# Patient Record
Sex: FEMALE | Race: WHITE | NOT HISPANIC OR LATINO | Employment: OTHER | URBAN - METROPOLITAN AREA
[De-identification: names, ages, dates, MRNs, and addresses within clinical notes are randomized per-mention and may not be internally consistent; named-entity substitution may affect disease eponyms.]

---

## 2017-03-20 ENCOUNTER — COMMUNICATION - HEALTHEAST (OUTPATIENT)
Dept: FAMILY MEDICINE | Facility: CLINIC | Age: 62
End: 2017-03-20

## 2017-07-05 ENCOUNTER — COMMUNICATION - HEALTHEAST (OUTPATIENT)
Dept: FAMILY MEDICINE | Facility: CLINIC | Age: 62
End: 2017-07-05

## 2017-07-06 ENCOUNTER — OFFICE VISIT - HEALTHEAST (OUTPATIENT)
Dept: FAMILY MEDICINE | Facility: CLINIC | Age: 62
End: 2017-07-06

## 2017-07-06 DIAGNOSIS — Z01.818 PRE-OPERATIVE CLEARANCE: ICD-10-CM

## 2017-07-06 DIAGNOSIS — Z00.00 HEALTH CARE MAINTENANCE: ICD-10-CM

## 2017-07-06 DIAGNOSIS — M21.612 BUNION OF LEFT FOOT: ICD-10-CM

## 2017-07-06 LAB
ATRIAL RATE - MUSE: 72 BPM
CHOLEST SERPL-MCNC: 168 MG/DL
DIASTOLIC BLOOD PRESSURE - MUSE: NORMAL MMHG
FASTING STATUS PATIENT QL REPORTED: NO
HDLC SERPL-MCNC: 44 MG/DL
INTERPRETATION ECG - MUSE: NORMAL
LDLC SERPL CALC-MCNC: 98 MG/DL
P AXIS - MUSE: 42 DEGREES
PR INTERVAL - MUSE: 118 MS
QRS DURATION - MUSE: 80 MS
QT - MUSE: 400 MS
QTC - MUSE: 438 MS
R AXIS - MUSE: -26 DEGREES
SYSTOLIC BLOOD PRESSURE - MUSE: NORMAL MMHG
T AXIS - MUSE: 50 DEGREES
TRIGL SERPL-MCNC: 130 MG/DL
VENTRICULAR RATE- MUSE: 72 BPM

## 2017-07-06 ASSESSMENT — MIFFLIN-ST. JEOR: SCORE: 1174.88

## 2017-07-07 ENCOUNTER — COMMUNICATION - HEALTHEAST (OUTPATIENT)
Dept: FAMILY MEDICINE | Facility: CLINIC | Age: 62
End: 2017-07-07

## 2017-09-12 ENCOUNTER — COMMUNICATION - HEALTHEAST (OUTPATIENT)
Dept: FAMILY MEDICINE | Facility: CLINIC | Age: 62
End: 2017-09-12

## 2017-11-03 ENCOUNTER — COMMUNICATION - HEALTHEAST (OUTPATIENT)
Dept: FAMILY MEDICINE | Facility: CLINIC | Age: 62
End: 2017-11-03

## 2017-11-09 ENCOUNTER — COMMUNICATION - HEALTHEAST (OUTPATIENT)
Dept: FAMILY MEDICINE | Facility: CLINIC | Age: 62
End: 2017-11-09

## 2017-11-09 ENCOUNTER — OFFICE VISIT - HEALTHEAST (OUTPATIENT)
Dept: FAMILY MEDICINE | Facility: CLINIC | Age: 62
End: 2017-11-09

## 2017-11-09 DIAGNOSIS — F33.2 SEVERE RECURRENT MAJOR DEPRESSION (H): ICD-10-CM

## 2017-11-09 DIAGNOSIS — F41.1 GENERALIZED ANXIETY DISORDER: ICD-10-CM

## 2018-02-05 ENCOUNTER — COMMUNICATION - HEALTHEAST (OUTPATIENT)
Dept: FAMILY MEDICINE | Facility: CLINIC | Age: 63
End: 2018-02-05

## 2018-05-30 ENCOUNTER — COMMUNICATION - HEALTHEAST (OUTPATIENT)
Dept: FAMILY MEDICINE | Facility: CLINIC | Age: 63
End: 2018-05-30

## 2018-07-01 ENCOUNTER — COMMUNICATION - HEALTHEAST (OUTPATIENT)
Dept: FAMILY MEDICINE | Facility: CLINIC | Age: 63
End: 2018-07-01

## 2018-07-01 DIAGNOSIS — R05.9 COUGH: ICD-10-CM

## 2018-07-24 ENCOUNTER — COMMUNICATION - HEALTHEAST (OUTPATIENT)
Dept: FAMILY MEDICINE | Facility: CLINIC | Age: 63
End: 2018-07-24

## 2018-07-24 DIAGNOSIS — R05.9 COUGH: ICD-10-CM

## 2018-09-17 ENCOUNTER — COMMUNICATION - HEALTHEAST (OUTPATIENT)
Dept: FAMILY MEDICINE | Facility: CLINIC | Age: 63
End: 2018-09-17

## 2018-09-17 DIAGNOSIS — R05.9 COUGH: ICD-10-CM

## 2018-10-17 ENCOUNTER — COMMUNICATION - HEALTHEAST (OUTPATIENT)
Dept: FAMILY MEDICINE | Facility: CLINIC | Age: 63
End: 2018-10-17

## 2018-10-23 ENCOUNTER — COMMUNICATION - HEALTHEAST (OUTPATIENT)
Dept: FAMILY MEDICINE | Facility: CLINIC | Age: 63
End: 2018-10-23

## 2018-10-23 DIAGNOSIS — R05.9 COUGH: ICD-10-CM

## 2018-11-20 ENCOUNTER — OFFICE VISIT - HEALTHEAST (OUTPATIENT)
Dept: FAMILY MEDICINE | Facility: CLINIC | Age: 63
End: 2018-11-20

## 2018-11-20 DIAGNOSIS — B37.0 ORAL PHARYNGEAL CANDIDIASIS: ICD-10-CM

## 2018-11-20 RX ORDER — BUPROPION HYDROCHLORIDE 150 MG/1
TABLET, FILM COATED, EXTENDED RELEASE ORAL
Status: SHIPPED | COMMUNITY
Start: 2018-11-01 | End: 2021-11-03

## 2018-11-20 RX ORDER — PREDNISONE 20 MG/1
TABLET ORAL
Status: SHIPPED | COMMUNITY
Start: 2018-11-14 | End: 2021-11-03

## 2018-11-20 ASSESSMENT — MIFFLIN-ST. JEOR: SCORE: 1151.63

## 2019-01-14 ENCOUNTER — COMMUNICATION - HEALTHEAST (OUTPATIENT)
Dept: FAMILY MEDICINE | Facility: CLINIC | Age: 64
End: 2019-01-14

## 2019-01-14 DIAGNOSIS — R05.9 COUGH: ICD-10-CM

## 2019-02-15 ENCOUNTER — COMMUNICATION - HEALTHEAST (OUTPATIENT)
Dept: FAMILY MEDICINE | Facility: CLINIC | Age: 64
End: 2019-02-15

## 2019-02-19 RX ORDER — SERTRALINE HYDROCHLORIDE 100 MG/1
TABLET, FILM COATED ORAL
Qty: 90 TABLET | Refills: 0 | Status: SHIPPED | OUTPATIENT
Start: 2019-02-19 | End: 2021-11-03

## 2019-08-24 ENCOUNTER — COMMUNICATION - HEALTHEAST (OUTPATIENT)
Dept: FAMILY MEDICINE | Facility: CLINIC | Age: 64
End: 2019-08-24

## 2019-08-24 DIAGNOSIS — R05.9 COUGH: ICD-10-CM

## 2019-11-12 ENCOUNTER — RECORDS - HEALTHEAST (OUTPATIENT)
Dept: ADMINISTRATIVE | Facility: OTHER | Age: 64
End: 2019-11-12

## 2020-01-19 ENCOUNTER — COMMUNICATION - HEALTHEAST (OUTPATIENT)
Dept: FAMILY MEDICINE | Facility: CLINIC | Age: 65
End: 2020-01-19

## 2020-01-19 DIAGNOSIS — R52 PAIN: ICD-10-CM

## 2020-02-24 ENCOUNTER — COMMUNICATION - HEALTHEAST (OUTPATIENT)
Dept: FAMILY MEDICINE | Facility: CLINIC | Age: 65
End: 2020-02-24

## 2020-02-24 DIAGNOSIS — R05.9 COUGH: ICD-10-CM

## 2020-05-06 ENCOUNTER — COMMUNICATION - HEALTHEAST (OUTPATIENT)
Dept: FAMILY MEDICINE | Facility: CLINIC | Age: 65
End: 2020-05-06

## 2020-05-06 DIAGNOSIS — R52 PAIN: ICD-10-CM

## 2020-06-21 ENCOUNTER — COMMUNICATION - HEALTHEAST (OUTPATIENT)
Dept: FAMILY MEDICINE | Facility: CLINIC | Age: 65
End: 2020-06-21

## 2020-06-21 DIAGNOSIS — R05.9 COUGH: ICD-10-CM

## 2020-08-10 ENCOUNTER — COMMUNICATION - HEALTHEAST (OUTPATIENT)
Dept: FAMILY MEDICINE | Facility: CLINIC | Age: 65
End: 2020-08-10

## 2020-08-10 DIAGNOSIS — R05.9 COUGH: ICD-10-CM

## 2020-09-01 ENCOUNTER — COMMUNICATION - HEALTHEAST (OUTPATIENT)
Dept: FAMILY MEDICINE | Facility: CLINIC | Age: 65
End: 2020-09-01

## 2020-09-01 DIAGNOSIS — R05.9 COUGH: ICD-10-CM

## 2020-09-04 RX ORDER — ALBUTEROL SULFATE 90 UG/1
AEROSOL, METERED RESPIRATORY (INHALATION)
Qty: 18 G | Refills: 0 | Status: SHIPPED | OUTPATIENT
Start: 2020-09-04

## 2020-10-01 ENCOUNTER — COMMUNICATION - HEALTHEAST (OUTPATIENT)
Dept: FAMILY MEDICINE | Facility: CLINIC | Age: 65
End: 2020-10-01

## 2020-10-01 DIAGNOSIS — R52 PAIN: ICD-10-CM

## 2020-10-03 RX ORDER — IBUPROFEN 800 MG/1
TABLET, FILM COATED ORAL
Qty: 90 TABLET | Refills: 0 | Status: SHIPPED | OUTPATIENT
Start: 2020-10-03 | End: 2021-11-03

## 2021-03-07 ENCOUNTER — IMMUNIZATION (OUTPATIENT)
Dept: NURSING | Facility: CLINIC | Age: 66
End: 2021-03-07
Payer: COMMERCIAL

## 2021-03-07 ENCOUNTER — HEALTH MAINTENANCE LETTER (OUTPATIENT)
Age: 66
End: 2021-03-07

## 2021-03-07 PROCEDURE — 0031A PR COVID VAC JANSSEN AD26 0.5ML: CPT

## 2021-03-07 PROCEDURE — 91303 PR COVID VAC JANSSEN AD26 0.5ML: CPT

## 2021-05-27 ENCOUNTER — RECORDS - HEALTHEAST (OUTPATIENT)
Dept: ADMINISTRATIVE | Facility: CLINIC | Age: 66
End: 2021-05-27

## 2021-05-28 ENCOUNTER — RECORDS - HEALTHEAST (OUTPATIENT)
Dept: ADMINISTRATIVE | Facility: CLINIC | Age: 66
End: 2021-05-28

## 2021-05-30 ENCOUNTER — RECORDS - HEALTHEAST (OUTPATIENT)
Dept: ADMINISTRATIVE | Facility: CLINIC | Age: 66
End: 2021-05-30

## 2021-05-31 VITALS — BODY MASS INDEX: 24.65 KG/M2 | WEIGHT: 141.38 LBS

## 2021-05-31 VITALS — BODY MASS INDEX: 24.95 KG/M2 | WEIGHT: 146.13 LBS | HEIGHT: 64 IN

## 2021-05-31 NOTE — TELEPHONE ENCOUNTER
Refill NOT  Approved  Refill NOT Given> 15 months since last office visit  Rx renewed per Medication Renewal Policy. Medication was last renewed on 1/15/19.    Mei Bone, [facility] Connection Triage/Med Refill 8/25/2019     Requested Prescriptions   Pending Prescriptions Disp Refills     VENTOLIN HFA 90 mcg/actuation inhaler [Pharmacy Med Name: Ventolin HFA Inhalation Aerosol Solution 108 (90 Base) MCG/ACT] 18 g 3     Sig: INHALE ONE OR TWO PUFFS BY MOUTH EVERY FOUR TO SIX HOURS AS NEEDED AND AS DIRECTED.       Albuterol/Levalbuterol Refill Protocol Passed - 8/24/2019  7:01 AM        Passed - PCP or prescribing provider visit in last year     Last office visit with prescriber/PCP: 11/9/2017 Keshia Woody MD OR same dept: 11/20/2018 Desirae Solis MD OR same specialty: 11/20/2018 Desirae Solis MD Last physical: 7/6/2017       Next appt within 3 mo: Visit date not found  Next physical within 3 mo: Visit date not found  Prescriber OR PCP: Keshia Woody MD  Last diagnosis associated with med order: 1. Cough  - VENTOLIN HFA 90 mcg/actuation inhaler [Pharmacy Med Name: Ventolin HFA Inhalation Aerosol Solution 108 (90 Base) MCG/ACT]; INHALE ONE OR TWO PUFFS BY MOUTH EVERY FOUR TO SIX HOURS AS NEEDED AND AS DIRECTED.  Dispense: 18 g; Refill: 3    If protocol passes may refill for 6 months if within 3 months of last provider visit (or a total of 9 months). If patient requesting >1 inhaler per month refill x 6 months and have patient make appointment with provider.

## 2021-06-02 VITALS — HEIGHT: 64 IN | WEIGHT: 141 LBS | BODY MASS INDEX: 24.07 KG/M2

## 2021-06-05 ENCOUNTER — RECORDS - HEALTHEAST (OUTPATIENT)
Dept: FAMILY MEDICINE | Facility: CLINIC | Age: 66
End: 2021-06-05

## 2021-06-05 NOTE — TELEPHONE ENCOUNTER
Refill sent today. Pt due for office visit before any further refills can be given. Please call to schedule. Recommend she schedule a physical exam

## 2021-06-05 NOTE — TELEPHONE ENCOUNTER
RN cannot approve Refill Request    RN can NOT refill this medication med is not covered by policy/route to provider. Last office visit: 11/9/2017 Keshia Woody MD Last Physical: 7/6/2017 Last MTM visit: Visit date not found Last visit same specialty: 11/20/2018 Desirae Solis MD.  Next visit within 3 mo: Visit date not found  Next physical within 3 mo: Visit date not found      Amee Torres, [facility] Triage/Med Refill 1/19/2020    Requested Prescriptions   Pending Prescriptions Disp Refills     ibuprofen (ADVIL,MOTRIN) 800 MG tablet [Pharmacy Med Name: Ibuprofen Oral Tablet 800 MG] 90 tablet 5     Sig: TAKE ONE TABLET BY MOUTH THREE TIMES DAILY       There is no refill protocol information for this order

## 2021-06-05 NOTE — TELEPHONE ENCOUNTER
Spoke to patient, she states she goes to the health clinic at her work for physicals and blood pressure checks which is why she has not been in. Advised patient that Dr. Woody may still need to see her and she requested I send a message to let Dr. Woody know she has been doing her physicals and blood pressure checks at the clinic at her work. CAROLA

## 2021-06-06 NOTE — TELEPHONE ENCOUNTER
Refill NOT Given    Refill given per Policy, patient informed they are overdue for Office Visit   OV 11/20/18    Mei Bone, [facility] Triage/Med Refill 2/25/2020    Requested Prescriptions   Pending Prescriptions Disp Refills     VENTOLIN HFA 90 mcg/actuation inhaler [Pharmacy Med Name: Ventolin HFA Inhalation Aerosol Solution 108 (90 Base) MCG/ACT] 18 g 2     Sig: INHALE ONE OR TWO PUFFS BY MOUTH EVERY FOUR TO SIX HOURS AS NEEDED AND AS DIRECTED.       Albuterol/Levalbuterol Refill Protocol Failed - 2/24/2020  1:15 PM        Failed - PCP or prescribing provider visit in last year     Last office visit with prescriber/PCP: 11/9/2017 Keshia Woody MD OR same dept: Visit date not found OR same specialty: 11/20/2018 Desirae Solis MD Last physical: 7/6/2017       Next appt within 3 mo: Visit date not found  Next physical within 3 mo: Visit date not found  Prescriber OR PCP: Keshia Woody MD  Last diagnosis associated with med order: 1. Cough  - VENTOLIN HFA 90 mcg/actuation inhaler [Pharmacy Med Name: Ventolin HFA Inhalation Aerosol Solution 108 (90 Base) MCG/ACT]; INHALE ONE OR TWO PUFFS BY MOUTH EVERY FOUR TO SIX HOURS AS NEEDED AND AS DIRECTED.  Dispense: 18 g; Refill: 2    If protocol passes may refill for 6 months if within 3 months of last provider visit (or a total of 9 months). If patient requesting >1 inhaler per month refill x 6 months and have patient make appointment with provider.

## 2021-06-08 NOTE — TELEPHONE ENCOUNTER
RN cannot approve Refill Request    RN can NOT refill this medication med is not covered by policy/route to provider. Last office visit: 11/9/2017 Keshia Woody MD Last Physical: 7/6/2017 Last MTM visit: Visit date not found Last visit same specialty: 11/20/2018 Desirae Solis MD.  Next visit within 3 mo: Visit date not found  Next physical within 3 mo: Visit date not found      Katja Nam, [facility] Triage/Med Refill 5/6/2020    Requested Prescriptions   Pending Prescriptions Disp Refills     ibuprofen (ADVIL,MOTRIN) 800 MG tablet [Pharmacy Med Name: Ibuprofen Oral Tablet 800 MG] 90 tablet 0     Sig: TAKE ONE TABLET BY MOUTH THREE TIMES DAILY       There is no refill protocol information for this order

## 2021-06-09 NOTE — TELEPHONE ENCOUNTER
RN cannot approve Refill Request    RN can NOT refill this medication PCP messaged that patient is overdue for Office Visit. Last office visit: 11/9/2017 Keshia Woody MD Last Physical: 7/6/2017 Last MTM visit: Visit date not found Last visit same specialty: 11/20/2018 Desirae Solis MD.  Next visit within 3 mo: Visit date not found  Next physical within 3 mo: Visit date not found      Amee Torres, [facility] Triage/Med Refill 6/21/2020    Requested Prescriptions   Pending Prescriptions Disp Refills     VENTOLIN HFA 90 mcg/actuation inhaler [Pharmacy Med Name: Ventolin HFA Inhalation Aerosol Solution 108 (90 Base) MCG/ACT] 18 g 0     Sig: INHALE 1 - 2 PUFFS BY MOUTH EVERY 4 - 6 HOURS AS NEEDED AND AS DIRECTED.       Albuterol/Levalbuterol Refill Protocol Failed - 6/21/2020  7:00 AM        Failed - PCP or prescribing provider visit in last year     Last office visit with prescriber/PCP: 11/9/2017 Keshia Woody MD OR same dept: Visit date not found OR same specialty: 11/20/2018 Desirae Solis MD Last physical: 7/6/2017       Next appt within 3 mo: Visit date not found  Next physical within 3 mo: Visit date not found  Prescriber OR PCP: Keshia Woody MD  Last diagnosis associated with med order: 1. Cough  - VENTOLIN HFA 90 mcg/actuation inhaler [Pharmacy Med Name: Ventolin HFA Inhalation Aerosol Solution 108 (90 Base) MCG/ACT]; INHALE 1 - 2 PUFFS BY MOUTH EVERY 4 - 6 HOURS AS NEEDED AND AS DIRECTED.  Dispense: 18 g; Refill: 0    If protocol passes may refill for 6 months if within 3 months of last provider visit (or a total of 9 months). If patient requesting >1 inhaler per month refill x 6 months and have patient make appointment with provider.

## 2021-06-10 NOTE — TELEPHONE ENCOUNTER
RN cannot approve Refill Request    RN can NOT refill this medication PCP messaged that patient is overdue for Office Visit. Last office visit: 11/9/2017 Keshia Woody MD Last Physical: 7/6/2017 Last MTM visit: Visit date not found Last visit same specialty: 11/20/2018 Desirae Solis MD.  Next visit within 3 mo: Visit date not found  Next physical within 3 mo: Visit date not found      Amee Torres, [facility] Triage/Med Refill 8/11/2020    Requested Prescriptions   Pending Prescriptions Disp Refills     VENTOLIN HFA 90 mcg/actuation inhaler [Pharmacy Med Name: Ventolin HFA Inhalation Aerosol Solution 108 (90 Base) MCG/ACT] 18 g 0     Sig: INHALE 1 - 2 PUFFS BY MOUTH EVERY 4 - 6 HOURS AS NEEDED AND AS DIRECTED.       Albuterol/Levalbuterol Refill Protocol Failed - 8/10/2020  5:37 PM        Failed - PCP or prescribing provider visit in last year     Last office visit with prescriber/PCP: 11/9/2017 Keshia Woody MD OR same dept: Visit date not found OR same specialty: 11/20/2018 Desirae Solis MD Last physical: 7/6/2017       Next appt within 3 mo: Visit date not found  Next physical within 3 mo: Visit date not found  Prescriber OR PCP: Keshia Woody MD  Last diagnosis associated with med order: 1. Cough  - VENTOLIN HFA 90 mcg/actuation inhaler [Pharmacy Med Name: Ventolin HFA Inhalation Aerosol Solution 108 (90 Base) MCG/ACT]; INHALE 1 - 2 PUFFS BY MOUTH EVERY 4 - 6 HOURS AS NEEDED AND AS DIRECTED.  Dispense: 18 g; Refill: 0    If protocol passes may refill for 6 months if within 3 months of last provider visit (or a total of 9 months). If patient requesting >1 inhaler per month refill x 6 months and have patient make appointment with provider.

## 2021-06-11 NOTE — TELEPHONE ENCOUNTER
RN cannot approve Refill Request    RN can NOT refill this medication Protocol failed and NO refill given. Last office visit: 11/9/2017 Keshia Woody MD Last Physical: 7/6/2017 Last MTM visit: Visit date not found Last visit same specialty: 11/20/2018 Desirae Solis MD.  Next visit within 3 mo: Visit date not found  Next physical within 3 mo: Visit date not found      Kathy Hurley, [facility] Triage/Med Refill 9/4/2020    Requested Prescriptions   Pending Prescriptions Disp Refills     albuterol (PROAIR HFA;PROVENTIL HFA;VENTOLIN HFA) 90 mcg/actuation inhaler [Pharmacy Med Name: Albuterol Sulfate HFA Inhalation Aerosol Solution 108 (90 Base) MCG/ACT] 18 g 0     Sig: INHALE 1 - 2 PUFFS BY MOUTH EVERY 4 - 6 HOURS AS NEEDED AND AS DIRECTED.       Albuterol/Levalbuterol Refill Protocol Failed - 9/1/2020  2:50 PM        Failed - PCP or prescribing provider visit in last year     Last office visit with prescriber/PCP: 11/9/2017 Keshia Woody MD OR same dept: Visit date not found OR same specialty: 11/20/2018 Desirae Solis MD Last physical: 7/6/2017       Next appt within 3 mo: Visit date not found  Next physical within 3 mo: Visit date not found  Prescriber OR PCP: Keshia Woody MD  Last diagnosis associated with med order: 1. Cough  - albuterol (PROAIR HFA;PROVENTIL HFA;VENTOLIN HFA) 90 mcg/actuation inhaler [Pharmacy Med Name: Albuterol Sulfate HFA Inhalation Aerosol Solution 108 (90 Base) MCG/ACT]; INHALE 1 - 2 PUFFS BY MOUTH EVERY 4 - 6 HOURS AS NEEDED AND AS DIRECTED.  Dispense: 18 g; Refill: 0    If protocol passes may refill for 6 months if within 3 months of last provider visit (or a total of 9 months). If patient requesting >1 inhaler per month refill x 6 months and have patient make appointment with provider.

## 2021-06-11 NOTE — PROGRESS NOTES
Assessment/Plan:      Visit for Preoperative Exam.     Patient approved for surgery with general or local anesthesia. Labs will be done as indicated. Above recommendations were reviewed with the patient. Copy of the pre-op was given to the patient to bring along on the day of surgery. Proceed with proposed surgery without additional clinical clarifications. No active cardiac conditions.     Underlying risk factors include history of tobacco use.  Advised smoking cessation    Advised to hold NSAIDs prior to surgery.    Labs today will include a hemogram and comprehensive metabolic panel.    Will obtain lipid cascade for health maintenance.  She was provided with stool cards for health maintenance.    Will start sertraline for treatment of anxiety and depression.  Prescription sent to pharmacy.  She will take 50 mg daily for 1 week and then increase to 100 mg daily.  Counseled on use of medication and side effects.  She will not plan to start this until after her surgery.    Subjective:     Scheduled Procedure: Left bunion removal  Surgery Date:  7/12/2017  Surgery Location:  [facility]  Surgeon:  DR Shepard    62-year-old female presents today for preoperative exam.  She has a bunion of the left foot that is causing discomfort.  History of left bunion surgery many years ago that was unsuccessful.  She is now scheduled for repeat bunionectomy.  Reviewed her health history.  History of anxiety and depression.  Not currently taking any medications.  Reports that she was recently started on citalopram at her wellness clinic at work.  Only took that for about 2 or 3 nights and then discontinued due to adverse side effects.  She is interested in going back on sertraline which she has taken in the past and tolerated well without any adverse side effects.  She felt it did help improve her symptoms but then discontinued when she felt that it was no longer effective.  She continues to smoke.  She is planning to  work on smoking cessation again.  No other chronic medical problems.  Has an albuterol inhaler that she uses rarely.  Reviewed allergies and medications.  No other concerns or questions today.    Current Outpatient Prescriptions   Medication Sig Dispense Refill     cyanocobalamin (VITAMIN B-12) 500 MCG tablet Take 1,000 mcg by mouth daily.       ibuprofen (ADVIL,MOTRIN) 800 MG tablet take 1 tablet by mouth 3 times per day as needed 90 tablet 0     VENTOLIN HFA 90 mcg/actuation inhaler INHALE 1 TO 2 PUFFS EVERY 4 TO 6 HOURS AS NEEDED AND AS DIRECTED 18 g 1     No current facility-administered medications for this visit.        No Known Allergies    Immunization History   Administered Date(s) Administered     Influenza [id number], 01/14/2010     Influenza, seasonal,quad inj 6-35 mos 11/07/2011     Pneumo Polysac 23-V 11/23/2015     Td, historic 12/21/2001     Tdap 06/06/2014       Patient Active Problem List   Diagnosis     Hypertension     Moderate Recurrent Major Depression     Lump In / On The Skin     Chronic Major Depression     Lower Back Pain     Lumbar Radiculopathy       History reviewed. No pertinent past medical history.    Social History     Social History     Marital status:      Spouse name: N/A     Number of children: N/A     Years of education: N/A     Occupational History     Not on file.     Social History Main Topics     Smoking status: Current Every Day Smoker     Smokeless tobacco: Not on file      Comment: cutting back 1/25/2016     Alcohol use 2.4 oz/week     4 Cans of beer per week     Drug use: No     Sexual activity: Not on file     Other Topics Concern     Not on file     Social History Narrative       Past Surgical History:   Procedure Laterality Date     BACK SURGERY       BUNIONECTOMY Bilateral      CARPAL TUNNEL RELEASE Bilateral      CHOLECYSTECTOMY       VAGOTOMY         History of Present Illness  Recent Health  Fever: no  Chills: no  Fatigue: yes--always tired  Chest Pain:  "no  Cough: no  Dyspnea: no  Urinary Frequency: no  Nausea: no  Vomiting: no  Diarrhea: no  Abdominal Pain: no  Easy Bruising: no  Lower Extremity Swelling: no  Poor Exercise Tolerance: no        Pertinent History  Prior Anesthesia: yes  Previous Anesthesia Reaction:  no  Diabetes: no  Cardiovascular Disease: no  Pulmonary Disease: no  Renal Disease: no  GI Disease: no  Sleep Apnea: no  Thromboembolic Problems: no  Clotting Disorder: no  Bleeding Disorder: no  Transfusion Reaction: no  Impaired Immunity: no  Steroid use in the last 6 months: no  Frequent Aspirin use: no    Family history of no pertinent family history    Social history of patient wears dentures or partial plates and there are no concerns regarding care after surgery    After surgery, the patient plans to recover at home with family.    Review of Systems  Pertinent items are noted in HPI.          Objective:         Vitals:    07/06/17 1358   BP: 140/78   Pulse: 67   Temp: 98.8  F (37.1  C)   TempSrc: Tympanic   SpO2: 97%   Weight: 146 lb 2 oz (66.3 kg)   Height: 5' 3.5\" (1.613 m)       Physical Exam:  Physical Exam:  General Appearance: Alert, cooperative, no distress, appears stated age  Head: Normocephalic, without obvious abnormality, atraumatic  Eyes: PERRL, conjunctiva/corneas clear, EOM's intact  Ears: Normal TM's and external ear canals, both ears  Nose: Nares normal, septum midline,mucosa normal, no drainage  Throat: Lips, mucosa, and tongue normal; teeth and gums normal  Neck: Supple, symmetrical, trachea midline, no adenopathy;  thyroid: not enlarged, symmetric, no tenderness/mass/nodules; no carotid bruit or JVD  Back: Symmetric, no curvature, ROM normal  Lungs: Clear to auscultation bilaterally, respirations unlabored  Heart: Regular rate and rhythm, S1 and S2 normal, no murmur, rub, or gallop, Abdomen: Soft, non-tender, bowel sounds active all four quadrants,  no masses, no organomegaly  Pelvic:Not examined  Extremities: Extremities " normal, atraumatic, no cyanosis or edema  Skin: Skin color, texture, turgor normal, no rashes or lesions  Lymph nodes: Cervical, supraclavicular nodes normal  Neurologic: Normal        Results: EKG performed in clinic.  I personally reviewed this EKG.  It showed sinus rhythm.  Heart rate 72.  Nonspecific ST segment changes.  No acute abnormalities.

## 2021-06-12 NOTE — TELEPHONE ENCOUNTER
RN cannot approve Refill Request    RN can NOT refill this medication med is not covered by policy/route to provider. Last office visit: 11/9/2017 Keshia Woody MD Last Physical: 7/6/2017 Last MTM visit: Visit date not found Last visit same specialty: 11/20/2018 Desirae Solis MD.  Next visit within 3 mo: Visit date not found  Next physical within 3 mo: Visit date not found      Amee Torres, [facility] Triage/Med Refill 10/3/2020    Requested Prescriptions   Pending Prescriptions Disp Refills     ibuprofen (ADVIL,MOTRIN) 800 MG tablet [Pharmacy Med Name: Ibuprofen Oral Tablet 800 MG] 90 tablet 0     Sig: TAKE ONE TABLET BY MOUTH THREE TIMES DAILY       There is no refill protocol information for this order

## 2021-06-14 NOTE — PROGRESS NOTES
Assessment/Plan:     1. Generalized anxiety disorder     2. Severe recurrent major depression         Diagnoses and all orders for this visit:    Generalized anxiety disorder    Severe recurrent major depression    Other orders  -     sertraline (ZOLOFT) 100 MG tablet; Take 1/2 tablet by mouth daily for 1 week and then increase to 1 tablet daily  Dispense: 90 tablet; Refill: 3    Discussed common side effects with sertraline.  Discussed it will take about a month before the medication starts to take effect.  I would recommend follow-up office visit in about 1 month's time.  Completed [place] paperwork.         Subjective:      Gabrielle Rod is a 62 y.o. female who comes in today for completion of FMLA paperwork.  She is currently going through a lot of emotional stress.  She moved about a week ago.  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  In the process of moving, the department of child protection services showed up at her doorstep to try to take her great grandson away.  She is now in the process of trying to obtain custody of the child.  She has several upcoming court dates and will be needing to schedule medical appointments for her great grandson as well as attend family therapy.  She is not able to concentrate and perform her work duties because of the anxiety and stress that she is under.  She is not able to sleep at night.  She is still not completely unpacked and settled into her new place.  She is very tearful during the encounter today.  Her last day of work was November 1 and she is planning to return on December 4, 2017.  Needs paperwork completed today for her to receive short-term disability income.  She is requesting to restart medication for management of anxiety and depression.  In the past has taken a number of SSRI medications.  Has had adverse side effects with the number of them including citalopram which has caused nausea.  She has done best with sertraline  although this does cause a little bit of nausea.  She would like to give the sertraline and try again and just plan to put up with the nausea.  She is using Benadryl to help with sleep.  Medications and allergies are reviewed.  No other concerns or questions today.    Current Outpatient Prescriptions   Medication Sig Dispense Refill     ibuprofen (ADVIL,MOTRIN) 800 MG tablet take 1 tablet by mouth 3 times per day as needed 90 tablet 0     cyanocobalamin (VITAMIN B-12) 500 MCG tablet Take 1,000 mcg by mouth daily.       sertraline (ZOLOFT) 100 MG tablet Take 1/2 tablet by mouth daily for 1 week and then increase to 1 tablet daily 90 tablet 3     VENTOLIN HFA 90 mcg/actuation inhaler INHALE 1 TO 2 PUFFS EVERY 4 TO 6 HOURS AS NEEDED AND AS DIRECTED 18 g 1     No current facility-administered medications for this visit.        Past Medical History, Family History, and Social History reviewed.  No past medical history on file.  Past Surgical History:   Procedure Laterality Date     BACK SURGERY       BUNIONECTOMY Bilateral      CARPAL TUNNEL RELEASE Bilateral      CHOLECYSTECTOMY       VAGOTOMY       Review of patient's allergies indicates no known allergies.  No family history on file.  Social History     Social History     Marital status:      Spouse name: N/A     Number of children: N/A     Years of education: N/A     Occupational History     Not on file.     Social History Main Topics     Smoking status: Current Every Day Smoker     Smokeless tobacco: Never Used      Comment: cutting back 1/25/2016     Alcohol use 2.4 oz/week     4 Cans of beer per week     Drug use: No     Sexual activity: Not on file     Other Topics Concern     Not on file     Social History Narrative         Review of systems is as stated in HPI, and the remainder of the 10 system review is otherwise negative.    Objective:     Vitals:    11/09/17 1259   BP: 136/88   Patient Site: Left Arm   Patient Position: Sitting   Cuff Size: Adult  Regular   Pulse: 84   SpO2: 99%   Weight: 141 lb 6 oz (64.1 kg)    Body mass index is 24.65 kg/(m^2).    General appearance: alert, appears stated age and cooperative  Neurologic: Grossly normal  Psych: mood appropriate, emotionally labile    Results: PHQ-9: 15   GEORGIE-7: 18    This note has been dictated using voice recognition software. Any grammatical or context distortions are unintentional and inherent to the the software.

## 2021-06-16 PROBLEM — R05.9 COUGH: Status: ACTIVE | Noted: 2018-07-02

## 2021-06-23 NOTE — TELEPHONE ENCOUNTER
RN cannot approve Refill Request    RN can NOT refill this medication med is not covered by policy/route to provider.      Kathy Hurley, [facility] Triage/Med Refill 1/15/2019    Requested Prescriptions   Pending Prescriptions Disp Refills      mg tablet [Pharmacy Med Name: IBU Oral Tablet 800 MG] 90 tablet 0     Sig: TAKE 1 TABLET BY MOUTH 3 TIMES DAILY AS NEEDED.    There is no refill protocol information for this order      Signed Prescriptions Disp Refills     VENTOLIN HFA 90 mcg/actuation inhaler 18 g 4     Sig: INHALE 1 TO 2 PUFFS BY MOUTH EVERY 4 TO 6 HOURS AS NEEDED AND AS DIRECTED.    Albuterol/Levalbuterol Refill Protocol Passed - 1/14/2019  1:22 PM       Passed - PCP or prescribing provider visit in last year    Last office visit with prescriber/PCP: 11/9/2017 Keshia Woody MD OR same dept: 11/20/2018 Desirae Solis MD OR same specialty: 11/20/2018 Desirae Solis MD Last physical: 7/6/2017       Next appt within 3 mo: Visit date not found  Next physical within 3 mo: Visit date not found  Prescriber OR PCP: Keshia Woody MD  Last diagnosis associated with med order: 1. Cough  - VENTOLIN HFA 90 mcg/actuation inhaler; INHALE 1 TO 2 PUFFS BY MOUTH EVERY 4 TO 6 HOURS AS NEEDED AND AS DIRECTED.  Dispense: 18 g; Refill: 4    If protocol passes may refill for 6 months if within 3 months of last provider visit (or a total of 9 months). If patient requesting >1 inhaler per month refill x 6 months and have patient make appointment with provider.

## 2021-06-23 NOTE — TELEPHONE ENCOUNTER
Refill Approved    Rx renewed per Medication Renewal Policy. Medication was last renewed on 10/24/18.    Kathy Hurley, [facility] Triage/Med Refill 1/15/2019     Requested Prescriptions   Pending Prescriptions Disp Refills     VENTOLIN HFA 90 mcg/actuation inhaler [Pharmacy Med Name: Ventolin HFA Inhalation Aerosol Solution 108 (90 Base) MCG/ACT] 18 g 0     Sig: INHALE 1 TO 2 PUFFS BY MOUTH EVERY 4 TO 6 HOURS AS NEEDED AND AS DIRECTED.    Albuterol/Levalbuterol Refill Protocol Passed - 1/14/2019  1:22 PM       Passed - PCP or prescribing provider visit in last year    Last office visit with prescriber/PCP: 11/9/2017 Keshia Woody MD OR same dept: 11/20/2018 Desirae Solis MD OR same specialty: 11/20/2018 Desirae Solis MD Last physical: 7/6/2017       Next appt within 3 mo: Visit date not found  Next physical within 3 mo: Visit date not found  Prescriber OR PCP: Keshia Woody MD  Last diagnosis associated with med order: 1. Cough  - VENTOLIN HFA 90 mcg/actuation inhaler [Pharmacy Med Name: Ventolin HFA Inhalation Aerosol Solution 108 (90 Base) MCG/ACT]; INHALE 1 TO 2 PUFFS BY MOUTH EVERY 4 TO 6 HOURS AS NEEDED AND AS DIRECTED.  Dispense: 18 g; Refill: 0    If protocol passes may refill for 6 months if within 3 months of last provider visit (or a total of 9 months). If patient requesting >1 inhaler per month refill x 6 months and have patient make appointment with provider.           mg tablet [Pharmacy Med Name: IBU Oral Tablet 800 MG] 90 tablet 0     Sig: TAKE 1 TABLET BY MOUTH 3 TIMES DAILY AS NEEDED.    There is no refill protocol information for this order

## 2021-06-24 NOTE — TELEPHONE ENCOUNTER
Left message to call back for: pt  Information to relay to patient:  Left message to call and schedule medication check with pcp.

## 2021-06-24 NOTE — TELEPHONE ENCOUNTER
Left message to call back for: pt  Information to relay to patient:  2nd message to call and schedule med check with pcp.

## 2021-06-24 NOTE — TELEPHONE ENCOUNTER
RN Cannot refill Rx    Last relevant visit was 11/9/17. She was seen for unrelated issue on 11/20/18 . Please advise!    Yolanda Beebe, [facility] Triage/Med Refill 2/18/2019     Requested Prescriptions   Pending Prescriptions Disp Refills     sertraline (ZOLOFT) 100 MG tablet [Pharmacy Med Name: Sertraline HCl Oral Tablet 100 MG] 90 tablet 2     Sig: Take 1/2 tablet by mouth daily for 1 week and then increase to 1 tablet daily.    SSRI Refill Protocol  Passed - 2/15/2019  9:35 AM       Passed - PCP or prescribing provider visit in last year    Last office visit with prescriber/PCP: 11/9/2017 Keshia Woody MD OR same dept: 11/20/2018 Desirae Solis MD OR same specialty: 11/20/2018 Desirae Solis MD  Last physical: 7/6/2017 Last MTM visit: Visit date not found   Next visit within 3 mo: Visit date not found  Next physical within 3 mo: Visit date not found  Prescriber OR PCP: Keshia Woody MD  Last diagnosis associated with med order: There are no diagnoses linked to this encounter.  If protocol passes may refill for 12 months if within 3 months of last provider visit (or a total of 15 months).

## 2021-06-26 NOTE — PROGRESS NOTES
Progress Notes by Desirae Solis MD at 11/20/2018  2:10 PM     Author: Desirae Solis MD Service: -- Author Type: Physician    Filed: 11/20/2018  4:10 PM Encounter Date: 11/20/2018 Status: Signed    : Desirae Solis MD (Physician)         Assessment/Plan:     1. Oral pharyngeal candidiasis  I suspect this is most likely related to prolonged course of prednisone to treat her hip injury and pain.  Advised avoidance of prednisone.  I think is unlikely that it is related to Wellbutrin, however think for now would be best for her to stay away.  I would be opposed to her trying it again in the future.  We will treat oropharyngeal and likely esophageal candidiasis with fluconazole daily for 14 days which should treat both.  She may use Maalox as needed both for heartburn and irritation in her mouth, advised to swish and swallow approach.  Continue to work on tobacco cessation.  We will have her take Prilosec once daily for 2 weeks to suppress any acid that might also be compounding irritation of her esophagus.  Notify with onset of additional symptoms, lack of improvement, or worsening.  Continue to monitor URI symptoms.      Patient Instructions     Patient Education     Candida Infection: Thrush  Thrush is a fungal infection in the mouth and throat. Thrush does not usually affect healthy adults. It is more common in people with a weak immune system. It is also more likely if you take antibiotics. Thrush is normally not contagious.  Understanding fungus in the mouth and throat  Your mouth and throat normally contain millions of tiny organisms. These include bacteria and yeasts. Many of these do not cause any problems. In fact, they may help fight disease.  Yeasts are a type of fungus. A type of yeast called Candida normally lives on the membranes of your mouth and throat. Usually, this yeast grows only in small amounts and is harmless. But in some cases, Candida can grow out of control and cause  thrush. Thrush is related to other kinds of Candida infections that can grow all over the body. Thrush refers to an infection of only the mouth and throat.  What causes thrush?  Thrush happens when something lets too much Candida grow inside your mouth and throat. Certain things that change the normal balance of organisms in the mouth can lead to thrush. One example is antibiotic medicine. This medicine may kill some of the normal bacteria in your mouth. Candida can then grow freely. People on antibiotics have an increased risk for thrush.  You have a higher risk for thrush if you:    Wear dentures    Are getting chemotherapy    Are getting radiation therapy    Have diabetes    Have a transplanted organ    Use corticosteroids, including inhaled corticosteroids for lung disease    Have a weak immune system, such as from AIDS    Are an older adult  Symptoms of thrush  Symptoms of thrush can include:    A dry, cottony feeling in your mouth    Cracking at the corners of the mouth    Loss of taste    Pain while eating or swallowing    White patches on the tongue and around the sides of the mouth  Diagnosing thrush  Your healthcare provider will ask about your medical history and your symptoms. He or she will look closely at your mouth and throat. White or red patches will be scraped with a tongue depressor. The sample will be sent to a lab to test. This test can usually confirm thrush.  If you have thrush, you may also have esophageal candidiasis. This is common in people who have HIV or a weak immune system. Your healthcare provider may check for this condition with an upper endoscopy. This is a procedure to look at the esophagus. A tissue sample may be taken to test.  Treatment for thrush  Thrush is usually treated with antifungal medicine. The medicine is put directly in your mouth and throat. You may be given a swish and swallow medicine or an antifungal lozenge.  In some cases, you may need an antifungal pill. This  can remove Candida throughout your body. Or you may need medicine through an intravenous line ( IV). These treatments depend on how severe your infection is, and what other health conditions you have.  If you are at high risk for thrush, you may need to keep taking oral antifungal medicine. This is to help prevent thrush in the future.  What happens if you dont get treated for thrush?  If untreated, the Candida may spread throughout your body. They may even enter your bloodstream. This can cause serious problems, such as organ failure and even death. Bloodstream infection may need to be treated with high doses of antifungal medicine through an IV.  Systemic infection is much more likely in people who are very ill. It is also more common in those who have serious problems with their immune system. Additional risk factors for systemic infection in very ill people include:    Central venous lines    IV nutrition    Use of broad-spectrum antibiotics    Kidney failure    Recent surgery  Preventing thrush  You may be able to help prevent some cases of thrush. Make sure to:    Practice good oral hygiene. Try using a chlorhexidine mouthwash.    Clean your dentures regularly as instructed. Make sure they fit you correctly.    After using a corticosteroid inhaler, rinse out your mouth with water or mouthwash.    Do not use broad-spectrum antibiotics, if possible.    Get treated for health problems that increase your risk for thrush, such as diabetes.     When to call the healthcare provider  Call your healthcare provider right away if you have any of these:    Cottony feeling in your mouth    Loss of taste    Pain while eating or swallowing    White patches or plaques on your tongue or inside your mouth   Date Last Reviewed: 5/1/2017 2000-2017 [employer]. [address] [place] [address], [place] [address]. All rights reserved. This information is not intended as a substitute for professional medical care. Always  follow your healthcare professional's instructions.                No Follow-up on file.      Subjective:      Gabrielle Rod is a 63 y.o. female presented to clinic today for evaluation of burning in her throat and heartburn.  She has had some difficulty with ongoing hip pain, was seen at the wellness clinic at her place of work, initially treated with a course of prednisone, then extended to complete a full 10-day course, 40 mg daily.  This is now completed, it did provide some relief of his pain though some of the discomfort persists.  She was also started on Wellbutrin at around the same to assist in tobacco cessation.  Notes that over the past several days she has developed a raw sensation in her throat, significant painful heartburn in her central chest, poor sleep, and intermittent vomiting.  She has had a cough off and on as well, especially at bedtime, feeling as though there is some postnasal drainage in her throat.  Minimal sinus congestion or pressure.  Her sore stomach she believes is from coughing.  She has had some night sweats, some sweats and chills during the daytime.  Her sleep has been disrupted.  Denies any changes in her bowels, no melena or nausea.  No prior history of significant ulcers, heartburn, etc.  Doing well with quitting smoking thus far.    Current Outpatient Medications   Medication Sig Dispense Refill   ? buPROPion HCl, smoking deter, (ZYBAN) 150 mg 12 hr tablet Take 1 tablet daily for 3 days, then increase to 1 tablet twice daily.  Start therapy 7 days before target quit date.     ? cyanocobalamin (VITAMIN B-12) 500 MCG tablet Take 1,000 mcg by mouth daily.     ?  mg tablet TAKE 1 TABLET BY MOUTH 3 TIMES DAILY AS NEEDED. 90 tablet 0   ? predniSONE (DELTASONE) 20 MG tablet Take two tablets by mouth once daily with food for 4 days.  Take one tablet by mouth once daily with food once daily for 1 day.  Take 0.5 tablets by mouth once daily for 2 days.     ? sertraline (ZOLOFT)  100 MG tablet Take 1/2 tablet by mouth daily for 1 week and then increase to 1 tablet daily 90 tablet 3   ? VENTOLIN HFA 90 mcg/actuation inhaler INHALE 1 TO 2 PUFFS EVERY 4 TO 6 HOURS AS NEEDED AND AS DIRECTED 1 Inhaler 1   ? fluconazole (DIFLUCAN) 200 MG tablet Take 2 tablets (400 mg total) by mouth daily for 14 days. 28 tablet 0   ? omeprazole (PRILOSEC) 20 MG capsule Take 1 capsule (20 mg total) by mouth daily before breakfast. 14 capsule 1     No current facility-administered medications for this visit.        Past Medical History, Family History, and Social History reviewed.  No past medical history on file.  Past Surgical History:   Procedure Laterality Date   ? BACK SURGERY     ? BUNIONECTOMY Bilateral    ? CARPAL TUNNEL RELEASE Bilateral    ? CHOLECYSTECTOMY     ? VAGOTOMY       Patient has no known allergies.  No family history on file.  Social History     Socioeconomic History   ? Marital status:      Spouse name: Not on file   ? Number of children: Not on file   ? Years of education: Not on file   ? Highest education level: Not on file   Social Needs   ? Financial resource strain: Not on file   ? Food insecurity - worry: Not on file   ? Food insecurity - inability: Not on file   ? Transportation needs - medical: Not on file   ? Transportation needs - non-medical: Not on file   Occupational History   ? Not on file   Tobacco Use   ? Smoking status: Current Every Day Smoker   ? Smokeless tobacco: Never Used   ? Tobacco comment: cutting back 1/25/2016   Substance and Sexual Activity   ? Alcohol use: Yes     Alcohol/week: 2.4 oz     Types: 4 Cans of beer per week   ? Drug use: No   ? Sexual activity: Not on file   Other Topics Concern   ? Not on file   Social History Narrative   ? Not on file         Review of systems is as stated in HPI, and the remainder of the 10 system review is otherwise negative.    Objective:     Vitals:    11/20/18 1422   BP: 120/78   Patient Site: Left Arm   Patient Position:  "Sitting   Cuff Size: Adult Regular   Pulse: 100   Resp: 20   Temp: 98.3  F (36.8  C)   TempSrc: Oral   SpO2: 98%   Weight: 141 lb (64 kg)   Height: 5' 3.5\" (1.613 m)    Body mass index is 24.59 kg/m .    Alert female.  Pupils are equal, round, reactive to light.  Tympanic membranes pearly and translucent.  Oropharynx is with significant thrush plaques with irregularity in her tongue and her posterior oropharynx.  Neck is supple with shotty anterior cervical lymphadenopathy.  Mild tenderness.  Heart is with regular rate and rhythm.  Lungs clear.  Abdomen is soft and nontender.  Extremities without edema or rashes.      This note has been dictated using voice recognition software. Any grammatical or context distortions are unintentional and inherent to the the software.        "

## 2021-07-21 ENCOUNTER — RECORDS - HEALTHEAST (OUTPATIENT)
Dept: ADMINISTRATIVE | Facility: CLINIC | Age: 66
End: 2021-07-21

## 2021-10-11 ENCOUNTER — HEALTH MAINTENANCE LETTER (OUTPATIENT)
Age: 66
End: 2021-10-11

## 2021-11-01 RX ORDER — NICOTINE 21 MG/24HR
1 PATCH, TRANSDERMAL 24 HOURS TRANSDERMAL
COMMUNITY
Start: 2021-02-09 | End: 2023-05-25

## 2021-11-01 RX ORDER — TIOTROPIUM BROMIDE 18 UG/1
CAPSULE ORAL; RESPIRATORY (INHALATION)
COMMUNITY
Start: 2020-06-02 | End: 2021-11-03

## 2021-11-01 RX ORDER — BUPROPION HYDROCHLORIDE 75 MG/1
75 TABLET ORAL
COMMUNITY
Start: 2020-01-07 | End: 2021-11-03

## 2021-11-01 RX ORDER — ASCORBIC ACID 125 MG
125 TABLET,CHEWABLE ORAL
COMMUNITY
Start: 2020-01-07 | End: 2021-11-03

## 2021-11-01 RX ORDER — CITALOPRAM HYDROBROMIDE 20 MG/1
20 TABLET ORAL
COMMUNITY
Start: 2020-09-04 | End: 2021-11-03

## 2021-11-03 ENCOUNTER — OFFICE VISIT (OUTPATIENT)
Dept: FAMILY MEDICINE | Facility: CLINIC | Age: 66
End: 2021-11-03
Payer: COMMERCIAL

## 2021-11-03 VITALS
OXYGEN SATURATION: 98 % | SYSTOLIC BLOOD PRESSURE: 134 MMHG | DIASTOLIC BLOOD PRESSURE: 66 MMHG | HEART RATE: 80 BPM | BODY MASS INDEX: 27.79 KG/M2 | WEIGHT: 159.4 LBS

## 2021-11-03 DIAGNOSIS — H25.9 AGE-RELATED CATARACT OF BOTH EYES, UNSPECIFIED AGE-RELATED CATARACT TYPE: ICD-10-CM

## 2021-11-03 DIAGNOSIS — H61.21 IMPACTED CERUMEN OF RIGHT EAR: ICD-10-CM

## 2021-11-03 DIAGNOSIS — Z72.0 TOBACCO USE: ICD-10-CM

## 2021-11-03 DIAGNOSIS — R52 PAIN: ICD-10-CM

## 2021-11-03 DIAGNOSIS — Z01.818 PREOPERATIVE EXAMINATION: Primary | ICD-10-CM

## 2021-11-03 DIAGNOSIS — F33.9 EPISODE OF RECURRENT MAJOR DEPRESSIVE DISORDER, UNSPECIFIED DEPRESSION EPISODE SEVERITY (H): ICD-10-CM

## 2021-11-03 DIAGNOSIS — Z23 HIGH PRIORITY FOR 2019-NCOV VACCINE: ICD-10-CM

## 2021-11-03 PROCEDURE — 99214 OFFICE O/P EST MOD 30 MIN: CPT | Mod: 25 | Performed by: FAMILY MEDICINE

## 2021-11-03 PROCEDURE — 91301 COVID-19,PF,MODERNA (18+ YRS BOOSTER .25ML): CPT | Performed by: FAMILY MEDICINE

## 2021-11-03 PROCEDURE — 0064A COVID-19,PF,MODERNA (18+ YRS BOOSTER .25ML): CPT | Performed by: FAMILY MEDICINE

## 2021-11-03 PROCEDURE — 69209 REMOVE IMPACTED EAR WAX UNI: CPT | Mod: RT | Performed by: FAMILY MEDICINE

## 2021-11-03 RX ORDER — BUPROPION HYDROCHLORIDE 75 MG/1
75 TABLET ORAL DAILY
Start: 2021-11-03 | End: 2022-07-18

## 2021-11-03 RX ORDER — IBUPROFEN 800 MG/1
TABLET, FILM COATED ORAL
Qty: 90 TABLET | Refills: 0 | Status: SHIPPED | OUTPATIENT
Start: 2021-11-03 | End: 2021-11-12

## 2021-11-03 ASSESSMENT — PATIENT HEALTH QUESTIONNAIRE - PHQ9
10. IF YOU CHECKED OFF ANY PROBLEMS, HOW DIFFICULT HAVE THESE PROBLEMS MADE IT FOR YOU TO DO YOUR WORK, TAKE CARE OF THINGS AT HOME, OR GET ALONG WITH OTHER PEOPLE: NOT DIFFICULT AT ALL
SUM OF ALL RESPONSES TO PHQ QUESTIONS 1-9: 4
SUM OF ALL RESPONSES TO PHQ QUESTIONS 1-9: 4

## 2021-11-03 NOTE — PROGRESS NOTES
[facility]  [address]  [facility] [address]  Phone: [phone]  Fax: [phone]  Primary Provider: Keshia Guajardo  Pre-op Performing Provider: KESHIA GUAJARDO      PREOPERATIVE EVALUATION:  Today's date: 11/3/2021    Gabrielle Rod is a 66 year old female who presents for a preoperative evaluation.    Surgical Information:  Surgery/Procedure: Cataract   Surgery Location: [facility]   Surgeon: Dr. Tavares  Surgery Date: 11/15/21 left and 11/29/21 right  Time of Surgery: tbd  Where patient plans to recover: At home with family  Fax number for surgical facility: [phone]    Type of Anesthesia Anticipated: to be determined    Assessment & Plan     The proposed surgical procedure is considered LOW risk.    Preoperative examination  No contraindications or significant risk factors to planned procedure    Age-related cataract of both eyes, unspecified age-related cataract type  Okay to proceed with procedure as planned    High priority for 2019-nCoV vaccine  - COVID-19,PF,MODERNA (18+ Yrs BOOSTER .25mL)    Tobacco use  She will plan to start bupropion and use nicotine patches for smoking cessation  - buPROPion (WELLBUTRIN) 75 MG tablet    Pain  Continue acetaminophen and ibuprofen as needed for pain.  Refill provided on ibuprofen  - ibuprofen (ADVIL/MOTRIN) 800 MG tablet  Dispense: 90 tablet; Refill: 0    Episode of recurrent major depressive disorder, unspecified depression episode severity (H)  She will be starting Wellbutrin    Impacted cerumen of right ear  Ear lavage performed by [facility]                   RECOMMENDATION:  APPROVAL GIVEN to proceed with proposed procedure, without further diagnostic evaluation.                      Subjective     HPI related to upcoming procedure: She has bilateral cataracts that are causing vision impairment.  Scheduled for cataract surgery.  Reviewed her current medications, allergies and health history.  Chart is updated.   She does have history of depression and anxiety.  She also has history of tobacco use.  She is planning to start Wellbutrin to treat depression as well as to help with smoking cessation.  She will also be using NicoDerm patches.  She has history of lower back pain and lumbar radiculopathy.  Uses acetaminophen to manage pain.  Occasionally will use ibuprofen.  She would like a refill of her ibuprofen.  She uses her albuterol inhaler occasionally for cough and shortness of breath related to tobacco use.  Review of systems is assessed and is otherwise negative.  No other concerns today.    Preop Questions 10/27/2021   1. Have you ever had a heart attack or stroke? No   2. Have you ever had surgery on your heart or blood vessels, such as a stent placement, a coronary artery bypass, or surgery on an artery in your head, neck, heart, or legs? No   3. Do you have chest pain with activity? No   4. Do you have a history of  heart failure? No   5. Do you currently have a cold, bronchitis or symptoms of other infection? No   6. Do you have a cough, shortness of breath, or wheezing? No   7. Do you or anyone in your family have previous history of blood clots? No   8. Do you or does anyone in your family have a serious bleeding problem such as prolonged bleeding following surgeries or cuts? No   9. Have you ever had problems with anemia or been told to take iron pills? No   10. Have you had any abnormal blood loss such as black, tarry or bloody stools, or abnormal vaginal bleeding? No   11. Have you ever had a blood transfusion? No   12. Are you willing to have a blood transfusion if it is medically needed before, during, or after your surgery? Yes   13. Have you or any of your relatives ever had problems with anesthesia? No   14. Do you have sleep apnea, excessive snoring or daytime drowsiness? No   15. Do you have any artifical heart valves or other implanted medical devices like a pacemaker, defibrillator, or continuous  glucose monitor? No   16. Do you have artificial joints? No   17. Are you allergic to latex? No       Health Care Directive:  Patient does not have a Health Care Directive or Living Will: Discussed advance care planning with patient; information given to patient to review.    Preoperative Review of :   reviewed - no record of controlled substances prescribed.      Status of Chronic Conditions:  See problem list for active medical problems.  Problems all longstanding and stable, except as noted/documented.  See ROS for pertinent symptoms related to these conditions.      Review of Systems  CONSTITUTIONAL: NEGATIVE for fever, chills, change in weight  INTEGUMENTARY/SKIN: NEGATIVE for worrisome rashes, moles or lesions  EYES: blurred vision bilateral  ENT/MOUTH: NEGATIVE for ear, mouth and throat problems  RESP: NEGATIVE for significant cough or SOB  CV: NEGATIVE for chest pain, palpitations or peripheral edema  GI: NEGATIVE for nausea, abdominal pain, heartburn, or change in bowel habits  : NEGATIVE for frequency, dysuria, or hematuria  MUSCULOSKELETAL: NEGATIVE for significant arthralgias or myalgia  NEURO: NEGATIVE for weakness, dizziness or paresthesias  ENDOCRINE: NEGATIVE for temperature intolerance, skin/hair changes  HEME: NEGATIVE for bleeding problems  PSYCHIATRIC: NEGATIVE for changes in mood or affect    Patient Active Problem List    Diagnosis Date Noted     Cough 07/02/2018     Priority: Medium     Hypertension      Priority: Medium     Created by Conversion  Replacement Utility updated for latest IMO load         Chronic Major Depression      Priority: Medium     Created by Conversion  Replacement Utility updated for latest IMO load         Moderate Recurrent Major Depression      Priority: Medium     Created by Conversion         Lower Back Pain      Priority: Medium     Created by Conversion         Lumbar Radiculopathy      Priority: Medium     Created by Conversion         Lump In / On The  Skin      Priority: Medium     Created by Conversion          No past medical history on file.  Past Surgical History:   Procedure Laterality Date     BACK SURGERY       BUNIONECTOMY Bilateral      CHOLECYSTECTOMY       RELEASE CARPAL TUNNEL Bilateral      VAGOTOMY       Current Outpatient Medications   Medication Sig Dispense Refill     albuterol (PROAIR HFA;PROVENTIL HFA;VENTOLIN HFA) 90 mcg/actuation inhaler [ALBUTEROL (PROAIR HFA;PROVENTIL HFA;VENTOLIN HFA) 90 MCG/ACTUATION INHALER] INHALE 1 - 2 PUFFS BY MOUTH EVERY 4 - 6 HOURS AS NEEDED AND AS DIRECTED. 18 g 0     buPROPion (WELLBUTRIN) 75 MG tablet Take 1 tablet (75 mg) by mouth daily       ibuprofen (ADVIL/MOTRIN) 800 MG tablet [IBUPROFEN (ADVIL,MOTRIN) 800 MG TABLET] TAKE ONE TABLET BY MOUTH THREE TIMES DAILY as needed 90 tablet 0     nicotine (NICODERM CQ) 14 MG/24HR 24 hr patch Place 1 patch onto the skin       nicotine (NICODERM CQ) 21 MG/24HR 24 hr patch Place 1 patch onto the skin       nicotine (NICODERM CQ) 7 MG/24HR 24 hr patch Place 1 patch onto the skin         No Known Allergies     Social History     Tobacco Use     Smoking status: Current Every Day Smoker     Smokeless tobacco: Never Used     Tobacco comment: cutting back 1/25/2016   Substance Use Topics     Alcohol use: Yes     Alcohol/week: 4.0 standard drinks       History   Drug Use No         Objective     /66 (BP Location: Right arm, Cuff Size: Adult Regular)   Pulse 80   Wt 72.3 kg (159 lb 6.4 oz)   SpO2 98%   BMI 27.79 kg/m      Physical Exam    GENERAL APPEARANCE: healthy, alert and no distress     EYES: EOMI, PERRL     HENT: ear canals and TM's normal and cerumen right     NECK: no adenopathy, no asymmetry, masses, or scars and thyroid normal to palpation     RESP: lungs clear to auscultation - no rales, rhonchi or wheezes     CV: regular rates and rhythm, normal S1 S2, no S3 or S4 and no murmur, click or rub     MS: extremities normal- no gross deformities noted, no evidence  of inflammation in joints, FROM in all extremities.     SKIN: no suspicious lesions or rashes     NEURO: Normal strength and tone, sensory exam grossly normal, mentation intact and speech normal     PSYCH: mentation appears normal. and affect normal/bright     LYMPHATICS: No cervical adenopathy    No results for input(s): HGB, PLT, INR, NA, POTASSIUM, CR, A1C in the last 00038 hours.     Diagnostics:  No labs were ordered during this visit.   No EKG required for low risk surgery (cataract, skin procedure, breast biopsy, etc).    Revised Cardiac Risk Index (RCRI):  The patient has the following serious cardiovascular risks for perioperative complications:   - No serious cardiac risks = 0 points     RCRI Interpretation: 0 points: Class I (very low risk - 0.4% complication rate)           Signed Electronically by: Keshia Wodoy MD  Copy of this evaluation report is provided to requesting physician.      Answers for HPI/ROS submitted by the patient on 11/3/2021  If you checked off any problems, how difficult have these problems made it for you to do your work, take care of things at home, or get along with other people?: Not difficult at all  PHQ9 TOTAL SCORE: 4

## 2021-11-04 ASSESSMENT — PATIENT HEALTH QUESTIONNAIRE - PHQ9: SUM OF ALL RESPONSES TO PHQ QUESTIONS 1-9: 4

## 2021-11-12 DIAGNOSIS — R52 PAIN: ICD-10-CM

## 2021-11-12 RX ORDER — IBUPROFEN 800 MG/1
TABLET, FILM COATED ORAL
Qty: 90 TABLET | Refills: 0 | Status: SHIPPED | OUTPATIENT
Start: 2021-11-12 | End: 2022-11-26

## 2022-01-30 ENCOUNTER — HEALTH MAINTENANCE LETTER (OUTPATIENT)
Age: 67
End: 2022-01-30

## 2022-03-27 ENCOUNTER — HEALTH MAINTENANCE LETTER (OUTPATIENT)
Age: 67
End: 2022-03-27

## 2022-07-15 PROBLEM — M21.612 HALLUX VALGUS WITH BUNIONS OF LEFT FOOT: Status: ACTIVE | Noted: 2017-06-09

## 2022-07-15 PROBLEM — M20.12 HALLUX VALGUS WITH BUNIONS OF LEFT FOOT: Status: ACTIVE | Noted: 2017-06-09

## 2022-07-15 PROBLEM — F17.200 TOBACCO USE DISORDER: Status: ACTIVE | Noted: 2017-04-13

## 2022-07-15 PROBLEM — H25.012 CORTICAL AGE-RELATED CATARACT OF LEFT EYE: Status: ACTIVE | Noted: 2021-10-06

## 2022-07-15 PROBLEM — M20.42 HAMMERTOE OF LEFT FOOT: Status: ACTIVE | Noted: 2017-06-09

## 2022-07-15 PROBLEM — M77.42 METATARSALGIA OF LEFT FOOT: Status: ACTIVE | Noted: 2017-06-09

## 2022-07-18 ENCOUNTER — OFFICE VISIT (OUTPATIENT)
Dept: FAMILY MEDICINE | Facility: CLINIC | Age: 67
End: 2022-07-18
Payer: COMMERCIAL

## 2022-07-18 VITALS
WEIGHT: 156 LBS | DIASTOLIC BLOOD PRESSURE: 72 MMHG | HEART RATE: 72 BPM | SYSTOLIC BLOOD PRESSURE: 128 MMHG | HEIGHT: 64 IN | BODY MASS INDEX: 26.63 KG/M2 | OXYGEN SATURATION: 98 %

## 2022-07-18 DIAGNOSIS — Z23 HIGH PRIORITY FOR 2019-NCOV VACCINE: ICD-10-CM

## 2022-07-18 DIAGNOSIS — M21.611 BUNION, RIGHT: ICD-10-CM

## 2022-07-18 DIAGNOSIS — Z01.818 PREOP GENERAL PHYSICAL EXAM: Primary | ICD-10-CM

## 2022-07-18 DIAGNOSIS — M20.41 HAMMER TOE OF RIGHT FOOT: ICD-10-CM

## 2022-07-18 LAB
ERYTHROCYTE [DISTWIDTH] IN BLOOD BY AUTOMATED COUNT: 13.4 % (ref 10–15)
HCT VFR BLD AUTO: 41.9 % (ref 35–47)
HGB BLD-MCNC: 14.3 G/DL (ref 11.7–15.7)
HOLD SPECIMEN: NORMAL
MCH RBC QN AUTO: 31.4 PG (ref 26.5–33)
MCHC RBC AUTO-ENTMCNC: 34.1 G/DL (ref 31.5–36.5)
MCV RBC AUTO: 92 FL (ref 78–100)
PLATELET # BLD AUTO: 329 10E3/UL (ref 150–450)
RBC # BLD AUTO: 4.56 10E6/UL (ref 3.8–5.2)
WBC # BLD AUTO: 8.5 10E3/UL (ref 4–11)

## 2022-07-18 PROCEDURE — 99214 OFFICE O/P EST MOD 30 MIN: CPT | Mod: 25 | Performed by: FAMILY MEDICINE

## 2022-07-18 PROCEDURE — 36415 COLL VENOUS BLD VENIPUNCTURE: CPT | Performed by: FAMILY MEDICINE

## 2022-07-18 PROCEDURE — 91306 COVID-19,PF,MODERNA (18+ YRS BOOSTER .25ML): CPT | Performed by: FAMILY MEDICINE

## 2022-07-18 PROCEDURE — 85027 COMPLETE CBC AUTOMATED: CPT | Performed by: FAMILY MEDICINE

## 2022-07-18 PROCEDURE — 0064A COVID-19,PF,MODERNA (18+ YRS BOOSTER .25ML): CPT | Performed by: FAMILY MEDICINE

## 2022-07-18 ASSESSMENT — PATIENT HEALTH QUESTIONNAIRE - PHQ9
SUM OF ALL RESPONSES TO PHQ QUESTIONS 1-9: 0
SUM OF ALL RESPONSES TO PHQ QUESTIONS 1-9: 0
10. IF YOU CHECKED OFF ANY PROBLEMS, HOW DIFFICULT HAVE THESE PROBLEMS MADE IT FOR YOU TO DO YOUR WORK, TAKE CARE OF THINGS AT HOME, OR GET ALONG WITH OTHER PEOPLE: NOT DIFFICULT AT ALL

## 2022-07-18 NOTE — LETTER
22          Gabrielle Rod  : 1955  [address]   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  She may work remotely during these dates: 22 and 2022.    Tyler Sanchez MD

## 2022-07-18 NOTE — PROGRESS NOTES
[facility]  [address] [facility] [address]  [facility] [address]  Phone: [phone]  Fax: [phone]  Primary Provider: Keshia Woody  Pre-op Performing Provider: ADALI EASLEY      PREOPERATIVE EVALUATION:  Today's date: 7/18/2022    Gabrielle Rod is a 67 year old female who presents for a preoperative evaluation.    Surgical Information:   Surgery/Procedure: RIGHT FIRST METATARSAL OSTEOTOMY WITH BUNIONECTOMY WITH PROBABLE AKIN OSTEOTOMY.  SECOND DIGIT HAMMERTOE REPAIR WITH METATARSOPHALANGEAL JOINT PINNING    Surgery Location: [facility]  Surgeon: Dr. Keller  Surgery Date: 7/27/22  Time of Surgery: tbd  Where patient plans to recover: At home with family  Fax number for surgical facility: [phone]    Type of Anesthesia Anticipated: to be determined    Assessment & Plan     The proposed surgical procedure is considered INTERMEDIATE risk.    Gabrielle was seen today for pre-op exam and imm/inj.    Diagnoses and all orders for this visit:    Preop general physical exam  -     CBC with platelets    Hammer toe of right foot    Bunion, right    High priority for 2019-nCoV vaccine  -     COVID-19,PF,MODERNA (18+ Yrs BOOSTER .25mL)    Other orders  -     Extra Tube; Future  -     Extra Tube           Risks and Recommendations:  The patient has the following additional risks and recommendations for perioperative complications:   - No identified additional risk factors other than previously addressed    Medication Instructions:  Patient is to take all scheduled medications on the day of surgery    RECOMMENDATION:  APPROVAL GIVEN to proceed with proposed procedure, without further diagnostic evaluation.      Subjective     HPI related to upcoming procedure:     She has significant foot pain issues secondary to hammertoe.  Patient also noted to have bunion.  She is scheduled for surgical procedure due to the amount of pain and dysfunction this has caused.    She has a history of  depression and anxiety which are under good control.  She is a smoker and is working on tobacco cessation.  She does not report any breathing difficulty and does not have any history of chronic lung disease.    She reports no history of problems with prior anesthesia and procedures.  She has no history of a blood clot or bleeding disorder.    Preop Questions 7/18/2022   1. Have you ever had a heart attack or stroke? No   2. Have you ever had surgery on your heart or blood vessels, such as a stent placement, a coronary artery bypass, or surgery on an artery in your head, neck, heart, or legs? No   3. Do you have chest pain with activity? No   4. Do you have a history of  heart failure? No   5. Do you currently have a cold, bronchitis or symptoms of other infection? No   6. Do you have a cough, shortness of breath, or wheezing? No   7. Do you or anyone in your family have previous history of blood clots? No   8. Do you or does anyone in your family have a serious bleeding problem such as prolonged bleeding following surgeries or cuts? No   9. Have you ever had problems with anemia or been told to take iron pills? No   10. Have you had any abnormal blood loss such as black, tarry or bloody stools, or abnormal vaginal bleeding? No   11. Have you ever had a blood transfusion? No   12. Are you willing to have a blood transfusion if it is medically needed before, during, or after your surgery? Yes   13. Have you or any of your relatives ever had problems with anesthesia? No   14. Do you have sleep apnea, excessive snoring or daytime drowsiness? No   15. Do you have any artifical heart valves or other implanted medical devices like a pacemaker, defibrillator, or continuous glucose monitor? No   16. Do you have artificial joints? No   17. Are you allergic to latex? No       Health Care Directive:  Patient does not have a Health Care Directive or Living Will:     Status of Chronic Conditions:  See problem list for active  medical problems.  Problems all longstanding and stable, except as noted/documented.  See ROS for pertinent symptoms related to these conditions.    Review of Systems  Complete review of systems is obtained.  Other than the specific considerations noted above complete review of systems is negative.    Patient Active Problem List    Diagnosis Date Noted     Cortical age-related cataract of left eye 10/06/2021     Priority: Medium     Formatting of this note might be different from the original.  Added automatically from request for surgery [id number]       Cough 07/02/2018     Priority: Medium     Metatarsalgia of left foot 06/09/2017     Priority: Medium     Formatting of this note might be different from the original.  Added automatically from request for surgery [id number]       Hammertoe of left foot 06/09/2017     Priority: Medium     Formatting of this note might be different from the original.  Added automatically from request for surgery [id number]       Hallux valgus with bunions of left foot 06/09/2017     Priority: Medium     Formatting of this note might be different from the original.  Added automatically from request for surgery [id number]       Tobacco use disorder 04/13/2017     Priority: Medium     Hypertension      Priority: Medium     Created by Conversion  Replacement Utility updated for latest IMO load         Chronic Major Depression      Priority: Medium     Created by Conversion  Replacement Utility updated for latest IMO load         Moderate Recurrent Major Depression      Priority: Medium     Created by Conversion         Lower Back Pain      Priority: Medium     Created by Conversion         Lumbar Radiculopathy      Priority: Medium     Created by Conversion         Lump In / On The Skin      Priority: Medium     Created by Conversion          No past medical history on file.  Past Surgical History:   Procedure Laterality Date     BACK SURGERY       BUNIONECTOMY Bilateral      CHOLECYSTECTOMY        "RELEASE CARPAL TUNNEL Bilateral      VAGOTOMY       Current Outpatient Medications   Medication Sig Dispense Refill     albuterol (PROAIR HFA;PROVENTIL HFA;VENTOLIN HFA) 90 mcg/actuation inhaler [ALBUTEROL (PROAIR HFA;PROVENTIL HFA;VENTOLIN HFA) 90 MCG/ACTUATION INHALER] INHALE 1 - 2 PUFFS BY MOUTH EVERY 4 - 6 HOURS AS NEEDED AND AS DIRECTED. 18 g 0     ibuprofen (ADVIL/MOTRIN) 800 MG tablet [IBUPROFEN (ADVIL,MOTRIN) 800 MG TABLET] TAKE ONE TABLET BY MOUTH THREE TIMES DAILY as needed 90 tablet 0     nicotine (NICODERM CQ) 14 MG/24HR 24 hr patch Place 1 patch onto the skin       nicotine (NICODERM CQ) 21 MG/24HR 24 hr patch Place 1 patch onto the skin       nicotine (NICODERM CQ) 7 MG/24HR 24 hr patch Place 1 patch onto the skin       umeclidinium (INCRUSE ELLIPTA) 62.5 MCG/INH inhaler Inhale 1 puff into the lungs         No Known Allergies     Social History     Tobacco Use     Smoking status: Current Every Day Smoker     Smokeless tobacco: Never Used     Tobacco comment: cutting back 1/25/2016   Substance Use Topics     Alcohol use: Yes     Alcohol/week: 4.0 standard drinks     No family history on file.  History   Drug Use No         Objective     /72   Pulse 72   Ht 1.626 m (5' 4\")   Wt 70.8 kg (156 lb)   SpO2 98%   BMI 26.78 kg/m      Physical Exam    General Appearance:    Alert, cooperative, no distress   Eyes:   No scleral icterus or conjunctival irritation       Ears:    Normal TM's and external ear canals, both ears   Throat:   Lips, mucosa, and tongue normal; teeth and gums normal   Neck:   Supple, symmetrical, trachea midline, no adenopathy;        thyroid:  No enlargement/tenderness/nodules   Lungs:     Clear to auscultation bilaterally, respirations unlabored, no wheezes or crackles   Heart:    Regular rate and rhythm,  No murmur   Abdomen:    Soft, no distention, no tenderness on palpation, no masses, no organomegaly     Extremities:  No edema, no joint swelling or redness, no evidence of " any injuries   Skin:  No concerning skin findings, no suspicious moles, no rashes   Neurologic:  On gross examination there is no motor or sensory deficit.  Patient walks with a normal gait     Recent Labs   Lab Test 07/18/22  1128   HGB 14.3           Diagnostics:  Recent Results (from the past 24 hour(s))   CBC with platelets    Collection Time: 07/18/22 11:28 AM   Result Value Ref Range    WBC Count 8.5 4.0 - 11.0 10e3/uL    RBC Count 4.56 3.80 - 5.20 10e6/uL    Hemoglobin 14.3 11.7 - 15.7 g/dL    Hematocrit 41.9 35.0 - 47.0 %    MCV 92 78 - 100 fL    MCH 31.4 26.5 - 33.0 pg    MCHC 34.1 31.5 - 36.5 g/dL    RDW 13.4 10.0 - 15.0 %    Platelet Count 329 150 - 450 10e3/uL       Revised Cardiac Risk Index (RCRI):  The patient has the following serious cardiovascular risks for perioperative complications:   - No serious cardiac risks = 0 points     RCRI Interpretation: 0 points: Class I (very low risk - 0.4% complication rate)    Signed Electronically by: Tyler Sanchez MD, MD  Copy of this evaluation report is provided to requesting physician.      Answers for HPI/ROS submitted by the patient on 7/18/2022  If you checked off any problems, how difficult have these problems made it for you to do your work, take care of things at home, or get along with other people?: Not difficult at all  PHQ9 TOTAL SCORE: 0

## 2022-07-22 ENCOUNTER — TELEPHONE (OUTPATIENT)
Dept: FAMILY MEDICINE | Facility: CLINIC | Age: 67
End: 2022-07-22

## 2022-07-22 NOTE — TELEPHONE ENCOUNTER
Pre op faxed 7/22/22 to [phone] and covid test scheduled for 7/25. Please fax results when final.

## 2022-07-25 ENCOUNTER — LAB (OUTPATIENT)
Dept: LAB | Facility: CLINIC | Age: 67
End: 2022-07-25
Payer: COMMERCIAL

## 2022-07-25 DIAGNOSIS — Z20.822 ENCOUNTER FOR LABORATORY TESTING FOR COVID-19 VIRUS: ICD-10-CM

## 2022-07-25 PROCEDURE — U0005 INFEC AGEN DETEC AMPLI PROBE: HCPCS

## 2022-07-25 PROCEDURE — U0003 INFECTIOUS AGENT DETECTION BY NUCLEIC ACID (DNA OR RNA); SEVERE ACUTE RESPIRATORY SYNDROME CORONAVIRUS 2 (SARS-COV-2) (CORONAVIRUS DISEASE [COVID-19]), AMPLIFIED PROBE TECHNIQUE, MAKING USE OF HIGH THROUGHPUT TECHNOLOGIES AS DESCRIBED BY CMS-2020-01-R: HCPCS

## 2022-07-26 LAB — SARS-COV-2 RNA RESP QL NAA+PROBE: NEGATIVE

## 2022-09-02 ENCOUNTER — NURSE TRIAGE (OUTPATIENT)
Dept: NURSING | Facility: CLINIC | Age: 67
End: 2022-09-02

## 2022-09-02 NOTE — TELEPHONE ENCOUNTER
Pt wanted to know the dates of her Covid vaccines.  All 3 given.    Pt has no further questions.     Disposition home.    Anette Morgan RN on 9/2/2022 at 11:37 AM       Additional Information    Negative: [1] Caller is not with the adult (patient) AND [2] reporting urgent symptoms    Negative: Lab result questions    Negative: Medication questions    Negative: Caller can't be reached by phone    Negative: Caller has already spoken to PCP or another triager    Negative: Requesting regular office appointment    Negative: [1] Caller requesting NON-URGENT health information AND [2] PCP's office is the best resource    Negative: Health Information question, no triage required and triager able to answer question    Negative: General information question, no triage required and triager able to answer question    Negative: Question about upcoming scheduled test, no triage required and triager able to answer question    Negative: [1] Caller is not with the adult (patient) AND [2] probable NON-URGENT symptoms    Negative: [1] Follow-up call to recent contact AND [2] information only call, no triage required    Protocols used: INFORMATION ONLY CALL - NO TRIAGE-A-

## 2022-09-25 ENCOUNTER — HEALTH MAINTENANCE LETTER (OUTPATIENT)
Age: 67
End: 2022-09-25

## 2022-11-25 DIAGNOSIS — R52 PAIN: ICD-10-CM

## 2022-11-25 NOTE — TELEPHONE ENCOUNTER
"Routing refill request to provider for review/approval because:  Labs not current:        Last Written Prescription Date:  11/12/21  Last Fill Quantity: 90,  # refills: 0   Last office visit provider:  7/18/22     Requested Prescriptions   Pending Prescriptions Disp Refills     ibuprofen (ADVIL/MOTRIN) 800 MG tablet [Pharmacy Med Name: Ibuprofen Oral Tablet 800 MG] 90 tablet 0     Sig: TAKE ONE TABLET BY MOUTH THREE TIMES DAILY as needed       NSAID Medications Failed - 11/25/2022  8:21 AM        Failed - Normal ALT on file in past 12 months     No lab results found.          Failed - Normal AST on file in past 12 months     No lab results found.          Failed - Patient is age 6-64 years        Failed - Normal serum creatinine on file in past 12 months     No lab results found.    Ok to refill medication if creatinine is low          Passed - Blood pressure under 140/90 in past 12 months     BP Readings from Last 3 Encounters:   07/18/22 128/72   11/03/21 134/66                 Passed - Recent (12 mo) or future (30 days) visit within the authorizing provider's specialty     Patient has had an office visit with the authorizing provider or a provider within the authorizing providers department within the previous 12 mos or has a future within next 30 days. See \"Patient Info\" tab in inbasket, or \"Choose Columns\" in Meds & Orders section of the refill encounter.              Passed - Normal CBC on file in past 12 months     Recent Labs   Lab Test 07/18/22  1128   WBC 8.5   RBC 4.56   HGB 14.3   HCT 41.9                    Passed - Medication is active on med list        Passed - No active pregnancy on record        Passed - No positive pregnancy test in past 12 months             Kathy Hurley, RN 11/25/22 5:35 PM  "

## 2022-11-26 RX ORDER — IBUPROFEN 800 MG/1
TABLET, FILM COATED ORAL
Qty: 90 TABLET | Refills: 0 | Status: SHIPPED | OUTPATIENT
Start: 2022-11-26 | End: 2023-05-25

## 2023-05-08 ENCOUNTER — HEALTH MAINTENANCE LETTER (OUTPATIENT)
Age: 68
End: 2023-05-08

## 2023-05-25 ENCOUNTER — OFFICE VISIT (OUTPATIENT)
Dept: FAMILY MEDICINE | Facility: CLINIC | Age: 68
End: 2023-05-25
Payer: COMMERCIAL

## 2023-05-25 VITALS
DIASTOLIC BLOOD PRESSURE: 83 MMHG | RESPIRATION RATE: 16 BRPM | TEMPERATURE: 97.5 F | HEART RATE: 69 BPM | SYSTOLIC BLOOD PRESSURE: 158 MMHG | OXYGEN SATURATION: 94 % | WEIGHT: 160.3 LBS | HEIGHT: 64 IN | BODY MASS INDEX: 27.37 KG/M2

## 2023-05-25 DIAGNOSIS — Z12.83 SKIN CANCER SCREENING: ICD-10-CM

## 2023-05-25 DIAGNOSIS — Z12.31 VISIT FOR SCREENING MAMMOGRAM: ICD-10-CM

## 2023-05-25 DIAGNOSIS — R52 PAIN: ICD-10-CM

## 2023-05-25 DIAGNOSIS — M77.8 CAPSULITIS OF RIGHT FOOT: ICD-10-CM

## 2023-05-25 DIAGNOSIS — Z72.0 TOBACCO USE: ICD-10-CM

## 2023-05-25 DIAGNOSIS — R03.0 ELEVATED BLOOD PRESSURE READING WITHOUT DIAGNOSIS OF HYPERTENSION: ICD-10-CM

## 2023-05-25 DIAGNOSIS — M25.871 SESAMOIDITIS OF RIGHT FOOT: ICD-10-CM

## 2023-05-25 DIAGNOSIS — D48.5 NEOPLASM OF UNCERTAIN BEHAVIOR OF SKIN: ICD-10-CM

## 2023-05-25 DIAGNOSIS — Z13.220 LIPID SCREENING: ICD-10-CM

## 2023-05-25 DIAGNOSIS — Z01.818 PRE-OP EXAM: Primary | ICD-10-CM

## 2023-05-25 LAB
ALBUMIN SERPL BCG-MCNC: 4.2 G/DL (ref 3.5–5.2)
ALP SERPL-CCNC: 132 U/L (ref 35–104)
ALT SERPL W P-5'-P-CCNC: 13 U/L (ref 10–35)
ANION GAP SERPL CALCULATED.3IONS-SCNC: 15 MMOL/L (ref 7–15)
AST SERPL W P-5'-P-CCNC: 20 U/L (ref 10–35)
ATRIAL RATE - MUSE: 68 BPM
BILIRUB SERPL-MCNC: 0.5 MG/DL
BUN SERPL-MCNC: 7.6 MG/DL (ref 8–23)
CALCIUM SERPL-MCNC: 8.8 MG/DL (ref 8.8–10.2)
CHLORIDE SERPL-SCNC: 107 MMOL/L (ref 98–107)
CHOLEST SERPL-MCNC: 220 MG/DL
CREAT SERPL-MCNC: 0.67 MG/DL (ref 0.51–0.95)
DEPRECATED HCO3 PLAS-SCNC: 20 MMOL/L (ref 22–29)
DIASTOLIC BLOOD PRESSURE - MUSE: NORMAL MMHG
ERYTHROCYTE [DISTWIDTH] IN BLOOD BY AUTOMATED COUNT: 13.5 % (ref 10–15)
GFR SERPL CREATININE-BSD FRML MDRD: >90 ML/MIN/1.73M2
GLUCOSE SERPL-MCNC: 82 MG/DL (ref 70–99)
HCT VFR BLD AUTO: 41 % (ref 35–47)
HDLC SERPL-MCNC: 60 MG/DL
HGB BLD-MCNC: 14 G/DL (ref 11.7–15.7)
INTERPRETATION ECG - MUSE: NORMAL
LDLC SERPL CALC-MCNC: 134 MG/DL
MCH RBC QN AUTO: 31.2 PG (ref 26.5–33)
MCHC RBC AUTO-ENTMCNC: 34.1 G/DL (ref 31.5–36.5)
MCV RBC AUTO: 91 FL (ref 78–100)
NONHDLC SERPL-MCNC: 160 MG/DL
P AXIS - MUSE: 32 DEGREES
PLATELET # BLD AUTO: 339 10E3/UL (ref 150–450)
POTASSIUM SERPL-SCNC: 3.5 MMOL/L (ref 3.4–5.3)
PR INTERVAL - MUSE: 136 MS
PROT SERPL-MCNC: 7.2 G/DL (ref 6.4–8.3)
QRS DURATION - MUSE: 88 MS
QT - MUSE: 424 MS
QTC - MUSE: 450 MS
R AXIS - MUSE: -33 DEGREES
RBC # BLD AUTO: 4.49 10E6/UL (ref 3.8–5.2)
SODIUM SERPL-SCNC: 142 MMOL/L (ref 136–145)
SYSTOLIC BLOOD PRESSURE - MUSE: NORMAL MMHG
T AXIS - MUSE: 6 DEGREES
TRIGL SERPL-MCNC: 128 MG/DL
TSH SERPL DL<=0.005 MIU/L-ACNC: 2.05 UIU/ML (ref 0.3–4.2)
VENTRICULAR RATE- MUSE: 68 BPM
WBC # BLD AUTO: 8.9 10E3/UL (ref 4–11)

## 2023-05-25 PROCEDURE — 80053 COMPREHEN METABOLIC PANEL: CPT | Performed by: FAMILY MEDICINE

## 2023-05-25 PROCEDURE — 36415 COLL VENOUS BLD VENIPUNCTURE: CPT | Performed by: FAMILY MEDICINE

## 2023-05-25 PROCEDURE — 80061 LIPID PANEL: CPT | Performed by: FAMILY MEDICINE

## 2023-05-25 PROCEDURE — 99214 OFFICE O/P EST MOD 30 MIN: CPT | Performed by: FAMILY MEDICINE

## 2023-05-25 PROCEDURE — 93010 ELECTROCARDIOGRAM REPORT: CPT | Performed by: INTERNAL MEDICINE

## 2023-05-25 PROCEDURE — 84443 ASSAY THYROID STIM HORMONE: CPT | Performed by: FAMILY MEDICINE

## 2023-05-25 PROCEDURE — 93005 ELECTROCARDIOGRAM TRACING: CPT | Performed by: FAMILY MEDICINE

## 2023-05-25 PROCEDURE — 85027 COMPLETE CBC AUTOMATED: CPT | Performed by: FAMILY MEDICINE

## 2023-05-25 RX ORDER — ACETAMINOPHEN 500 MG
500-1000 TABLET ORAL
COMMUNITY

## 2023-05-25 RX ORDER — IBUPROFEN 800 MG/1
TABLET, FILM COATED ORAL
Qty: 90 TABLET | Refills: 3 | Status: SHIPPED | OUTPATIENT
Start: 2023-05-25

## 2023-05-25 RX ORDER — VARENICLINE TARTRATE 1 MG/1
TABLET, FILM COATED ORAL
COMMUNITY
Start: 2023-05-08 | End: 2023-06-06

## 2023-05-25 ASSESSMENT — PATIENT HEALTH QUESTIONNAIRE - PHQ9
10. IF YOU CHECKED OFF ANY PROBLEMS, HOW DIFFICULT HAVE THESE PROBLEMS MADE IT FOR YOU TO DO YOUR WORK, TAKE CARE OF THINGS AT HOME, OR GET ALONG WITH OTHER PEOPLE: NOT DIFFICULT AT ALL
SUM OF ALL RESPONSES TO PHQ QUESTIONS 1-9: 6
SUM OF ALL RESPONSES TO PHQ QUESTIONS 1-9: 6

## 2023-05-25 ASSESSMENT — PAIN SCALES - GENERAL: PAINLEVEL: NO PAIN (0)

## 2023-05-25 NOTE — PROGRESS NOTES
[facility]  [address]  [facility] [address]  Phone: [phone]  Fax: [phone]  Primary Provider: Keshia Guajardo  Pre-op Performing Provider: KESHIA GUAJARDO      PREOPERATIVE EVALUATION:  Today's date: 5/25/2023    Gabrielle Rod is a 68 year old female who presents for a preoperative evaluation.      5/25/2023     8:31 AM   Additional Questions   Accompanied by Grandson     Surgical Information:  Surgery/Procedure: Hammer toe revision  Surgery Location: [facility]  Surgeon: Dr. Keller  Surgery Date: 5/31/2023  Time of Surgery: tbd  Where patient plans to recover: At home with family  Fax number for surgical facility:     Assessment & Plan     The proposed surgical procedure is considered INTERMEDIATE risk.    Pre-op exam  No contraindications or significant risk factors to planned procedure.  Continue to work on smoking cessation.  Labs today as noted below  - EKG 12-lead, tracing only  - CBC with platelets  - Comprehensive metabolic panel (BMP + Alb, Alk Phos, ALT, AST, Total. Bili, TP)    Capsulitis of right foot  Okay to proceed with planned procedure    Sesamoiditis of right foot  Okay to proceed with planned procedure    Pain  Continue ibuprofen as needed for pain.  Avoid use 2 to 3 days prior to surgery  - ibuprofen (ADVIL/MOTRIN) 800 MG tablet  Dispense: 90 tablet; Refill: 3    Visit for screening mammogram    - MA SCREENING DIGITAL BILAT - Future  (s+30)    Lipid screening    - Lipid panel reflex to direct LDL Fasting    Neoplasm of uncertain behavior of skin    - Adult Dermatology Referral    Skin cancer screening    - Adult Dermatology Referral    Elevated blood pressure reading without diagnosis of hypertension  - TSH  Blood pressure a little bit elevated today in clinic.  Previous blood pressure readings normal.  At this time, advised her to work on healthy lifestyle changes smoking cessation.  She is cutting back on caffeine intake as well.  Recommend  monitoring of blood pressure at home and follow-up if blood pressure is persistently above 150/90    Tobacco use  Stable on Chantix.  Continue to work on smoking cessation              - No identified additional risk factors other than previously addressed    Antiplatelet or Anticoagulation Medication Instructions:   - Patient is on no antiplatelet or anticoagulation medications.    Additional Medication Instructions:  Patient is to take all scheduled medications on the day of surgery    RECOMMENDATION:  APPROVAL GIVEN to proceed with proposed procedure, without further diagnostic evaluation.            Subjective       HPI related to upcoming procedure:   She has history of a hammertoe on the right foot and a bunion of the right foot.  Underwent surgery last summer.  Has continued to have pain and limited mobility.  Only received short-term relief with steroid injection. Other conservative treatments have failed.  She is now planning to proceed with second metatarsal capsulectomy with condylectomy.    She has history of depression and anxiety which are under good control without medication.  She is a smoker and continues to work on smoking cessation.  Currently taking Chantix.  She is down to about 1/4 pack/day.  She does not have any history of chronic lung disease.  Is prescribed an albuterol inhaler which she uses typically in the spring when her allergies are flaring up.  She has noticed improvement in chronic cough with cutting back on tobacco use.    She does report concern about multiple skin lesions on her arm and chest.  They are dry and scaly.  She has never seen a dermatologist and she does have history of significant sun exposure in the past.    She reports that she feels tired and that she does not have a lot of energy.  Blood pressure is noted to be elevated today.  She has not been experiencing any headaches.  No recent chest pain or pressure.  No vision changes.  She has not recently been sick.   Review of systems is assessed and is otherwise negative.  She has no other concerns or questions today.        5/25/2023     8:04 AM   Preop Questions   1. Have you ever had a heart attack or stroke? No   2. Have you ever had surgery on your heart or blood vessels, such as a stent placement, a coronary artery bypass, or surgery on an artery in your head, neck, heart, or legs? No   3. Do you have chest pain with activity? No   4. Do you have a history of  heart failure? No   5. Do you currently have a cold, bronchitis or symptoms of other infection? No   6. Do you have a cough, shortness of breath, or wheezing? YES - due to smoking. Improved with cutting back on tobacco use   7. Do you or anyone in your family have previous history of blood clots? No   8. Do you or does anyone in your family have a serious bleeding problem such as prolonged bleeding following surgeries or cuts? No   9. Have you ever had problems with anemia or been told to take iron pills? No   10. Have you had any abnormal blood loss such as black, tarry or bloody stools, or abnormal vaginal bleeding? No   11. Have you ever had a blood transfusion? No   12. Are you willing to have a blood transfusion if it is medically needed before, during, or after your surgery? Yes   13. Have you or any of your relatives ever had problems with anesthesia? No   14. Do you have sleep apnea, excessive snoring or daytime drowsiness? No   15. Do you have any artifical heart valves or other implanted medical devices like a pacemaker, defibrillator, or continuous glucose monitor? No   16. Do you have artificial joints? No   17. Are you allergic to latex? No       Health Care Directive:  Patient does not have a Health Care Directive or Living Will: Discussed advance care planning with patient; information given to patient to review.    Preoperative Review of :   reviewed - controlled substances prescribed by other outside provider(s).          Review of  Systems  Constitutional, neuro, ENT, endocrine, pulmonary, cardiac, gastrointestinal, genitourinary, musculoskeletal, integument and psychiatric systems are negative, except as otherwise noted.    Patient Active Problem List    Diagnosis Date Noted     Cortical age-related cataract of left eye 10/06/2021     Priority: Medium     Formatting of this note might be different from the original.  Added automatically from request for surgery [id number]       Cough 07/02/2018     Priority: Medium     Metatarsalgia of left foot 06/09/2017     Priority: Medium     Formatting of this note might be different from the original.  Added automatically from request for surgery [id number]       Hammertoe of left foot 06/09/2017     Priority: Medium     Formatting of this note might be different from the original.  Added automatically from request for surgery [id number]       Hallux valgus with bunions of left foot 06/09/2017     Priority: Medium     Formatting of this note might be different from the original.  Added automatically from request for surgery [id number]       Tobacco use disorder 04/13/2017     Priority: Medium     Hypertension      Priority: Medium     Created by Conversion  Replacement Utility updated for latest IMO load         Lower Back Pain      Priority: Medium     Created by Conversion         Lumbar Radiculopathy      Priority: Medium     Created by Conversion         Lump In / On The Skin      Priority: Medium     Created by Conversion          No past medical history on file.  Past Surgical History:   Procedure Laterality Date     BACK SURGERY       BUNIONECTOMY Bilateral      CHOLECYSTECTOMY       RELEASE CARPAL TUNNEL Bilateral      VAGOTOMY       Current Outpatient Medications   Medication Sig Dispense Refill     acetaminophen (TYLENOL) 500 MG tablet Take 500-1,000 mg by mouth       albuterol (PROAIR HFA;PROVENTIL HFA;VENTOLIN HFA) 90 mcg/actuation inhaler [ALBUTEROL (PROAIR HFA;PROVENTIL HFA;VENTOLIN HFA) 90  "MCG/ACTUATION INHALER] INHALE 1 - 2 PUFFS BY MOUTH EVERY 4 - 6 HOURS AS NEEDED AND AS DIRECTED. 18 g 0     ibuprofen (ADVIL/MOTRIN) 800 MG tablet TAKE ONE TABLET BY MOUTH THREE TIMES DAILY as needed 90 tablet 3     varenicline (CHANTIX) 1 MG tablet Take 1 Tablet by mouth two times a day. Take after eating with a full glass of water.*         No Known Allergies     Social History     Tobacco Use     Smoking status: Every Day     Smokeless tobacco: Never     Tobacco comments:     cutting back 1/25/2016   Vaping Use     Vaping status: Not on file   Substance Use Topics     Alcohol use: Yes     Alcohol/week: 4.0 standard drinks of alcohol       History   Drug Use No         Objective     BP (!) 158/83 (BP Location: Left arm, Patient Position: Sitting, Cuff Size: Adult Regular)   Pulse 69   Temp 97.5  F (36.4  C) (Temporal)   Resp 16   Ht 1.632 m (5' 4.25\")   Wt 72.7 kg (160 lb 4.8 oz)   SpO2 94%   BMI 27.30 kg/m      Physical Exam    GENERAL APPEARANCE: healthy, alert and no distress     EYES: EOMI, PERRL     HENT: nose and mouth without ulcers or lesions and cerumen left     NECK: no adenopathy, no asymmetry, masses, or scars and thyroid normal to palpation     RESP: lungs clear to auscultation - no rales, rhonchi or wheezes     CV: regular rates and rhythm, normal S1 S2, no S3 or S4 and no murmur, click or rub     ABDOMEN:  soft, nontender, no HSM or masses and bowel sounds normal     MS: extremities normal- no gross deformities noted, no evidence of inflammation in joints, FROM in all extremities.     SKIN: - seborrheic and actinic present on arms and chest     NEURO: Normal strength and tone, sensory exam grossly normal, mentation intact and speech normal     PSYCH: mentation appears normal. and affect normal/bright    Recent Labs   Lab Test 07/18/22  1128   HGB 14.3           Diagnostics:  Labs pending at this time.  Results will be reviewed when available.   EKG: Normal Sinus Rhythm, nonspecific ST-T " changes, unchanged from previous tracings    Revised Cardiac Risk Index (RCRI):  The patient has the following serious cardiovascular risks for perioperative complications:   - No serious cardiac risks = 0 points     RCRI Interpretation: 0 points: Class I (very low risk - 0.4% complication rate)           Signed Electronically by: Keshia Woody MD  Copy of this evaluation report is provided to requesting physician.      Answers for HPI/ROS submitted by the patient on 5/25/2023  If you checked off any problems, how difficult have these problems made it for you to do your work, take care of things at home, or get along with other people?: Not difficult at all  PHQ9 TOTAL SCORE: 6

## 2023-05-25 NOTE — PATIENT INSTRUCTIONS
Monitor BP at home and follow up if BP persistently above 150/90    Schedule annual wellness visit in July or August

## 2023-05-26 ENCOUNTER — TELEPHONE (OUTPATIENT)
Dept: FAMILY MEDICINE | Facility: CLINIC | Age: 68
End: 2023-05-26
Payer: COMMERCIAL

## 2023-06-06 ENCOUNTER — TELEPHONE (OUTPATIENT)
Dept: FAMILY MEDICINE | Facility: CLINIC | Age: 68
End: 2023-06-06
Payer: COMMERCIAL

## 2023-06-06 DIAGNOSIS — Z72.0 TOBACCO USE: Primary | ICD-10-CM

## 2023-06-06 RX ORDER — VARENICLINE TARTRATE 1 MG/1
TABLET, FILM COATED ORAL
Qty: 60 TABLET | Refills: 3 | Status: SHIPPED | OUTPATIENT
Start: 2023-06-06 | End: 2024-04-17

## 2023-06-06 NOTE — TELEPHONE ENCOUNTER
Per chart review, Chantix was reported by patient & discussed with provider during ov on 5/25. Med pended with 30 day supply. Will route to PCP to review & advise as appropriate.    Lokesh Patterson RN, BSN  [facility]

## 2023-06-06 NOTE — TELEPHONE ENCOUNTER
Reason for call:  Other     Patient called regarding (reason for call): prescription    Additional comments: Patient is calling and wondering if she can get a starter pack for varenicline. Patient states she is going to  other medications and wondering if you could send it today. Please advise and call patient if needed please and thank you.    Phone number to reach patient:  Home number on file [phone] (home)    Best Time:  any    Can we leave a detailed message on this number?  YES

## 2023-10-02 ENCOUNTER — NURSE TRIAGE (OUTPATIENT)
Dept: NURSING | Facility: CLINIC | Age: 68
End: 2023-10-02
Payer: COMMERCIAL

## 2023-10-02 NOTE — TELEPHONE ENCOUNTER
Nurse Triage SBAR    Is this a 2nd Level Triage? NO    Situation: Elevated blood pressure    Background: Patient states that about a week ago she was in the clinic for a cold and her blood pressure was elevated.  She calls today because she took her BP yesterday at [employer] at it was 167/96.  She denies any headaches or vision changes. States that she was feeling dizzy when she had the cold but is not dizzy any more.  She does not take any BP medication.      Assessment: Elevated BP    Protocol Recommended Disposition:   See in Office Within 3 Days    Recommendation: Care advice given per protocol and call back precautions discussed.  Patient was warm transferred to scheduling to make an appointment.       N/A    KOFI RIBEIRO RN    Does the patient meet one of the following criteria for ADS visit consideration? No      Reason for Disposition   Systolic BP >= 160 OR Diastolic >= 100    Additional Information   Negative: Sounds like a life-threatening emergency to the triager   Negative: Symptom is main concern (e.g., headache, chest pain)   Negative: Low blood pressure is main concern   Negative: Systolic BP >= 160 OR Diastolic >= 100, and any cardiac (e.g., breathing difficulty, chest pain) or neurologic symptoms (e.g., new-onset blurred or double vision)   Negative: Pregnant 20 or more weeks (or postpartum < 6 weeks) with new hand or face swelling   Negative: Pregnant 20 or more weeks (or postpartum < 6 weeks) and Systolic BP >= 160 OR Diastolic >= 110   Negative: Patient sounds very sick or weak to the triager   Negative: Systolic BP >= 200 OR Diastolic >= 120 and having NO cardiac or neurologic symptoms   Negative: Pregnant 20 or more weeks (or postpartum < 6 weeks) with Systolic BP >= 140 OR Diastolic >= 90   Negative: Systolic BP >= 180 OR Diastolic >= 110, and missed most recent dose of blood pressure medication   Negative: Systolic BP >= 180 OR Diastolic >= 110   Negative: Patient wants to be seen    Negative: Ran out of BP medications   Negative: Taking BP medications and feels is having side effects (e.g., impotence, cough, dizziness)    Protocols used: Blood Pressure - High-A-OH

## 2023-10-03 ENCOUNTER — OFFICE VISIT (OUTPATIENT)
Dept: FAMILY MEDICINE | Facility: CLINIC | Age: 68
End: 2023-10-03
Payer: COMMERCIAL

## 2023-10-03 VITALS
DIASTOLIC BLOOD PRESSURE: 70 MMHG | SYSTOLIC BLOOD PRESSURE: 126 MMHG | HEART RATE: 74 BPM | RESPIRATION RATE: 17 BRPM | WEIGHT: 156.3 LBS | OXYGEN SATURATION: 97 % | BODY MASS INDEX: 26.62 KG/M2

## 2023-10-03 DIAGNOSIS — R07.89 CHEST DISCOMFORT: Primary | ICD-10-CM

## 2023-10-03 DIAGNOSIS — E78.5 HYPERLIPIDEMIA LDL GOAL <100: ICD-10-CM

## 2023-10-03 PROCEDURE — 99214 OFFICE O/P EST MOD 30 MIN: CPT | Performed by: FAMILY MEDICINE

## 2023-10-03 RX ORDER — ATORVASTATIN CALCIUM 20 MG/1
20 TABLET, FILM COATED ORAL DAILY
Qty: 90 TABLET | Refills: 1 | Status: SHIPPED | OUTPATIENT
Start: 2023-10-03 | End: 2023-10-03

## 2023-10-03 RX ORDER — ATORVASTATIN CALCIUM 20 MG/1
20 TABLET, FILM COATED ORAL DAILY
Qty: 90 TABLET | Refills: 1 | Status: SHIPPED | OUTPATIENT
Start: 2023-10-03

## 2023-10-03 ASSESSMENT — PAIN SCALES - GENERAL: PAINLEVEL: NO PAIN (0)

## 2023-10-03 NOTE — PATIENT INSTRUCTIONS
Take a week off from the chantix to see if the dizziness gets resolved. If it does, then we will try other methods to quit smoking (definitely can do the patch!)  For the chest discomfort, let's get a stress test done to look for signs of heart disease.   Be cautious of the caffeine drinks!

## 2023-10-24 ENCOUNTER — TELEPHONE (OUTPATIENT)
Dept: FAMILY MEDICINE | Facility: CLINIC | Age: 68
End: 2023-10-24
Payer: COMMERCIAL

## 2023-10-24 DIAGNOSIS — R07.89 CHEST DISCOMFORT: Primary | ICD-10-CM

## 2023-10-24 NOTE — TELEPHONE ENCOUNTER
Order/Referral Request    Who is requesting: [facility]     Orders being requested: Nuclear stress test    Reason service is needed/diagnosis: [facility] had to cancel her Echo Stress test today, so they are requesting she get a order for the Nuclear stress test     When are orders needed by: ASAP, By Thursday 10/26 or Friday 10/27    Has this been discussed with Provider: No    Does patient have a preference on a Group/Provider/Facility? [facility]     Does patient have an appointment scheduled?: No    Where to send orders: Place orders within Epic    Could [facility] or would you prefer to receive a phone call?:   No preference   Okay to leave a detailed message?: Yes at Other phone number:  May call [facility] at [phone]

## 2023-11-10 ENCOUNTER — HOSPITAL ENCOUNTER (OUTPATIENT)
Dept: NUCLEAR MEDICINE | Facility: HOSPITAL | Age: 68
Discharge: HOME | End: 2023-11-10
Attending: FAMILY MEDICINE
Payer: COMMERCIAL

## 2023-11-10 ENCOUNTER — HOSPITAL ENCOUNTER (OUTPATIENT)
Dept: CARDIOLOGY | Facility: HOSPITAL | Age: 68
Discharge: HOME | End: 2023-11-10
Attending: FAMILY MEDICINE
Payer: COMMERCIAL

## 2023-11-10 DIAGNOSIS — R07.89 CHEST DISCOMFORT: ICD-10-CM

## 2023-11-10 LAB
CV STRESS CURRENT BP HE: NORMAL
CV STRESS CURRENT HR HE: 71
CV STRESS CURRENT HR HE: 75
CV STRESS CURRENT HR HE: 76
CV STRESS CURRENT HR HE: 76
CV STRESS CURRENT HR HE: 77
CV STRESS CURRENT HR HE: 79
CV STRESS CURRENT HR HE: 82
CV STRESS CURRENT HR HE: 83
CV STRESS CURRENT HR HE: 86
CV STRESS CURRENT HR HE: 87
CV STRESS CURRENT HR HE: 87
CV STRESS CURRENT HR HE: 88
CV STRESS CURRENT HR HE: 93
CV STRESS CURRENT HR HE: 95
CV STRESS CURRENT HR HE: 95
CV STRESS CURRENT HR HE: 97
CV STRESS DEVIATION TIME HE: NORMAL
CV STRESS ECHO PERCENT HR HE: NORMAL
CV STRESS EXERCISE STAGE HE: NORMAL
CV STRESS FINAL RESTING BP HE: NORMAL
CV STRESS FINAL RESTING HR HE: 76
CV STRESS MAX HR HE: 98
CV STRESS MAX TREADMILL GRADE HE: 0
CV STRESS MAX TREADMILL SPEED HE: 0
CV STRESS PEAK DIA BP HE: NORMAL
CV STRESS PEAK SYS BP HE: NORMAL
CV STRESS PHASE HE: NORMAL
CV STRESS PROTOCOL HE: NORMAL
CV STRESS RESTING PT POSITION HE: NORMAL
CV STRESS ST DEVIATION AMOUNT HE: NORMAL
CV STRESS ST DEVIATION ELEVATION HE: NORMAL
CV STRESS ST EVELATION AMOUNT HE: NORMAL
CV STRESS TEST TYPE HE: NORMAL
CV STRESS TOTAL STAGE TIME MIN 1 HE: NORMAL
NUC STRESS EJECTION FRACTION: 68 %
RATE PRESSURE PRODUCT: NORMAL
STRESS ECHO BASELINE DIASTOLIC HE: 88
STRESS ECHO BASELINE HR: 70
STRESS ECHO BASELINE SYSTOLIC BP: 204
STRESS ECHO CALCULATED PERCENT HR: 65 %
STRESS ECHO LAST STRESS DIASTOLIC BP: 81
STRESS ECHO LAST STRESS HR: 86
STRESS ECHO LAST STRESS SYSTOLIC BP: 185
STRESS ECHO TARGET HR: 151

## 2023-11-10 PROCEDURE — 93017 CV STRESS TEST TRACING ONLY: CPT

## 2023-11-10 PROCEDURE — 78452 HT MUSCLE IMAGE SPECT MULT: CPT

## 2023-11-10 PROCEDURE — 78452 HT MUSCLE IMAGE SPECT MULT: CPT | Mod: 26 | Performed by: INTERNAL MEDICINE

## 2023-11-10 PROCEDURE — 343N000001 HC RX 343: Performed by: FAMILY MEDICINE

## 2023-11-10 PROCEDURE — 93016 CV STRESS TEST SUPVJ ONLY: CPT | Performed by: INTERNAL MEDICINE

## 2023-11-10 PROCEDURE — 250N000011 HC RX IP 250 OP 636: Performed by: FAMILY MEDICINE

## 2023-11-10 PROCEDURE — A9500 TC99M SESTAMIBI: HCPCS | Performed by: FAMILY MEDICINE

## 2023-11-10 PROCEDURE — 93018 CV STRESS TEST I&R ONLY: CPT | Performed by: INTERNAL MEDICINE

## 2023-11-10 RX ORDER — CAFFEINE 200 MG
200 TABLET ORAL
Status: DISCONTINUED | OUTPATIENT
Start: 2023-11-10 | End: 2023-11-10 | Stop reason: HOSPADM

## 2023-11-10 RX ORDER — REGADENOSON 0.08 MG/ML
0.4 INJECTION, SOLUTION INTRAVENOUS ONCE
Status: COMPLETED | OUTPATIENT
Start: 2023-11-10 | End: 2023-11-10

## 2023-11-10 RX ORDER — CAFFEINE CITRATE 20 MG/ML
60 SOLUTION INTRAVENOUS
Status: DISCONTINUED | OUTPATIENT
Start: 2023-11-10 | End: 2023-11-10 | Stop reason: HOSPADM

## 2023-11-10 RX ORDER — ALBUTEROL SULFATE 0.83 MG/ML
2.5 SOLUTION RESPIRATORY (INHALATION)
Status: DISCONTINUED | OUTPATIENT
Start: 2023-11-10 | End: 2023-11-10 | Stop reason: HOSPADM

## 2023-11-10 RX ORDER — AMINOPHYLLINE 25 MG/ML
50 INJECTION, SOLUTION INTRAVENOUS
Status: DISCONTINUED | OUTPATIENT
Start: 2023-11-10 | End: 2023-11-10 | Stop reason: HOSPADM

## 2023-11-10 RX ADMIN — Medication 29.5 MILLICURIE: at 08:05

## 2023-11-10 RX ADMIN — REGADENOSON 0.4 MG: 0.08 INJECTION, SOLUTION INTRAVENOUS at 08:01

## 2023-11-10 RX ADMIN — Medication 7.85 MILLICURIE: at 07:03

## 2023-12-06 ENCOUNTER — OFFICE VISIT (OUTPATIENT)
Dept: FAMILY MEDICINE | Facility: CLINIC | Age: 68
End: 2023-12-06
Payer: COMMERCIAL

## 2023-12-06 VITALS
OXYGEN SATURATION: 97 % | TEMPERATURE: 98.2 F | RESPIRATION RATE: 24 BRPM | HEART RATE: 75 BPM | DIASTOLIC BLOOD PRESSURE: 62 MMHG | BODY MASS INDEX: 27.28 KG/M2 | HEIGHT: 64 IN | SYSTOLIC BLOOD PRESSURE: 124 MMHG | WEIGHT: 159.8 LBS

## 2023-12-06 DIAGNOSIS — R41.840 DIFFICULTY CONCENTRATING: Primary | ICD-10-CM

## 2023-12-06 DIAGNOSIS — R05.3 CHRONIC COUGH: ICD-10-CM

## 2023-12-06 PROCEDURE — 99214 OFFICE O/P EST MOD 30 MIN: CPT | Performed by: FAMILY MEDICINE

## 2023-12-06 RX ORDER — UMECLIDINIUM 62.5 UG/1
1 AEROSOL, POWDER ORAL DAILY
Qty: 30 EACH | Refills: 4 | Status: SHIPPED | OUTPATIENT
Start: 2023-12-06

## 2023-12-06 RX ORDER — BUPROPION HYDROCHLORIDE 150 MG/1
150 TABLET ORAL EVERY MORNING
Qty: 90 TABLET | Refills: 0 | Status: SHIPPED | OUTPATIENT
Start: 2023-12-06 | End: 2024-04-17

## 2023-12-06 ASSESSMENT — PATIENT HEALTH QUESTIONNAIRE - PHQ9
SUM OF ALL RESPONSES TO PHQ QUESTIONS 1-9: 6
SUM OF ALL RESPONSES TO PHQ QUESTIONS 1-9: 6
10. IF YOU CHECKED OFF ANY PROBLEMS, HOW DIFFICULT HAVE THESE PROBLEMS MADE IT FOR YOU TO DO YOUR WORK, TAKE CARE OF THINGS AT HOME, OR GET ALONG WITH OTHER PEOPLE: SOMEWHAT DIFFICULT

## 2023-12-06 NOTE — PATIENT INSTRUCTIONS
For the lung stuff, we will do the PULMONARY FUNCTION TEST. Start the spiriva. Do not take it the day of the testing.  For the wellbutrin, start it in the AM.

## 2023-12-06 NOTE — PROGRESS NOTES
Assessment & Plan     Chronic cough  Chronic, suspect COPD or other obstructive process. Recommend anti muscarinic for chronic cough.   - umeclidinium (INCRUSE ELLIPTA) 62.5 MCG/ACT inhaler  Dispense: 30 each; Refill: 4  - General PFT Lab (Please always keep checked)  - Pulmonary Function Test    Difficulty concentrating  Chronic. Patient with anxiety, difficulty organizing tasks. She also reports some anhedonia. Recommend wellbutrin to see if symptoms would improve.   - buPROPion (WELLBUTRIN XL) 150 MG 24 hr tablet  Dispense: 90 tablet; Refill: 0      Ordering of each unique test  Prescription drug management  I spent a total of 38 minutes on the day of the visit.   Time spent by me doing chart review, history and exam, documentation and further activities per the note        SHIRIN BANKS DO  [facility] KASSANDRAFRAN Anthony is a 68 year old, presenting for the following health issues:  Results (Follow up on stress test results)        12/6/2023    12:46 PM   Additional Questions   Roomed by Lynda       History of Present Illness       Hypertension: She presents for follow up of hypertension.  She does not check blood pressure  regularly outside of the clinic. Outside blood pressures have been over 140/90. She does not follow a low salt diet.     She eats 0-1 servings of fruits and vegetables daily.She consumes 3 sweetened beverage(s) daily.She exercises with enough effort to increase her heart rate 9 or less minutes per day.  She exercises with enough effort to increase her heart rate 3 or less days per week.   She is taking medications regularly.     Dizziness resolved after stopping the chantix.     Her burning and tingling did improve. Patient reporting pain with walking. She will get pain bilaterally in her posterior calves.     Fatigued  -Patient feels like she has low interest in things  -She feels tired  -She is able to take care of herself around the house and  "maintaining her ADLs    BP Readings from Last 3 Encounters:   12/06/23 124/62   10/03/23 126/70   05/25/23 (!) 158/83     Review of Systems   Constitutional, HEENT, cardiovascular, pulmonary, gi and gu systems are negative, except as otherwise noted.      Objective    /62   Pulse 75   Temp 98.2  F (36.8  C) (Oral)   Resp 24   Ht 1.626 m (5' 4\")   Wt 72.5 kg (159 lb 12.8 oz)   LMP  (LMP Unknown)   SpO2 97%   BMI 27.43 kg/m    Body mass index is 27.43 kg/m .  Physical Exam   GENERAL: healthy, alert and no distress  EYES: Eyes grossly normal to inspection, PERRL and conjunctivae and sclerae normal  NECK: no adenopathy, no asymmetry, masses, or scars and thyroid normal to palpation  RESP: lungs clear to auscultation - no rales, rhonchi or wheezes  CV: regular rate and rhythm, normal S1 S2, no S3 or S4, no murmur, click or rub, no peripheral edema and peripheral pulses strong  MS: no gross musculoskeletal defects noted, no edema  SKIN: no suspicious lesions or rashes                  "

## 2024-03-02 ENCOUNTER — HEALTH MAINTENANCE LETTER (OUTPATIENT)
Age: 69
End: 2024-03-02

## 2024-04-17 ENCOUNTER — OFFICE VISIT (OUTPATIENT)
Dept: FAMILY MEDICINE | Facility: CLINIC | Age: 69
End: 2024-04-17
Payer: COMMERCIAL

## 2024-04-17 VITALS
DIASTOLIC BLOOD PRESSURE: 82 MMHG | BODY MASS INDEX: 27.49 KG/M2 | OXYGEN SATURATION: 97 % | RESPIRATION RATE: 17 BRPM | HEIGHT: 64 IN | WEIGHT: 161 LBS | TEMPERATURE: 98.4 F | HEART RATE: 89 BPM | SYSTOLIC BLOOD PRESSURE: 138 MMHG

## 2024-04-17 DIAGNOSIS — Z01.818 PREOP GENERAL PHYSICAL EXAM: Primary | ICD-10-CM

## 2024-04-17 DIAGNOSIS — R41.840 DIFFICULTY CONCENTRATING: ICD-10-CM

## 2024-04-17 DIAGNOSIS — J01.90 ACUTE SINUSITIS WITH SYMPTOMS > 10 DAYS: ICD-10-CM

## 2024-04-17 DIAGNOSIS — M20.41 HAMMER TOE OF RIGHT FOOT: ICD-10-CM

## 2024-04-17 PROCEDURE — 99214 OFFICE O/P EST MOD 30 MIN: CPT | Performed by: FAMILY MEDICINE

## 2024-04-17 RX ORDER — BUPROPION HYDROCHLORIDE 150 MG/1
150 TABLET ORAL EVERY MORNING
Qty: 90 TABLET | Refills: 1 | Status: SHIPPED | OUTPATIENT
Start: 2024-04-17

## 2024-04-17 RX ORDER — AZITHROMYCIN 250 MG/1
TABLET, FILM COATED ORAL
COMMUNITY

## 2024-04-17 ASSESSMENT — PAIN SCALES - GENERAL: PAINLEVEL: EXTREME PAIN (8)

## 2024-04-17 ASSESSMENT — PATIENT HEALTH QUESTIONNAIRE - PHQ9
SUM OF ALL RESPONSES TO PHQ QUESTIONS 1-9: 4
SUM OF ALL RESPONSES TO PHQ QUESTIONS 1-9: 4
10. IF YOU CHECKED OFF ANY PROBLEMS, HOW DIFFICULT HAVE THESE PROBLEMS MADE IT FOR YOU TO DO YOUR WORK, TAKE CARE OF THINGS AT HOME, OR GET ALONG WITH OTHER PEOPLE: NOT DIFFICULT AT ALL

## 2024-04-17 NOTE — PATIENT INSTRUCTIONS
Preparing for Your Surgery  Getting started  A nurse will call you to review your health history and instructions. They will give you an arrival time based on your scheduled surgery time. Please be ready to share:  Your doctor's clinic name and phone number  Your medical, surgical, and anesthesia history  A list of allergies and sensitivities  A list of medicines, including herbal treatments and over-the-counter drugs  Whether the patient has a legal guardian (ask how to send us the papers in advance)  Please tell us if you're pregnant--or if there's any chance you might be pregnant. Some surgeries may injure a fetus (unborn baby), so they require a pregnancy test. Surgeries that are safe for a fetus don't always need a test, and you can choose whether to have one.   If you have a child who's having surgery, please ask for a copy of Preparing for Your Child's Surgery.    Preparing for surgery  Within 10 to 30 days of surgery: Have a pre-op exam (sometimes called an H&P, or History and Physical). This can be done at a clinic or pre-operative center.  If you're having a , you may not need this exam. Talk to your care team.  At your pre-op exam, talk to your care team about all medicines you take. If you need to stop any medicines before surgery, ask when to start taking them again.  We do this for your safety. Many medicines can make you bleed too much during surgery. Some change how well surgery (anesthesia) drugs work.  Call your insurance company to let them know you're having surgery. (If you don't have insurance, call [phone].)  Call your clinic if there's any change in your health. This includes signs of a cold or flu (sore throat, runny nose, cough, rash, fever). It also includes a scrape or scratch near the surgery site.  If you have questions on the day of surgery, call your hospital or surgery center.  Eating and drinking guidelines  For your safety: Unless your surgeon tells you otherwise,  follow the guidelines below.  Eat and drink as usual until 8 hours before you arrive for surgery. After that, no food or milk.  Drink clear liquids until 2 hours before you arrive. These are liquids you can see through, like water, Gatorade, and Propel Water. They also include plain black coffee and tea (no cream or milk), candy, and breath mints. You can spit out gum when you arrive.  If you drink alcohol: Stop drinking it the night before surgery.  If your care team tells you to take medicine on the morning of surgery, it's okay to take it with a sip of water.  Preventing infection  Shower or bathe the night before and morning of your surgery. Follow the instructions your clinic gave you. (If no instructions, use regular soap.)  Don't shave or clip hair near your surgery site. We'll remove the hair if needed.  Don't smoke or vape the morning of surgery. You may chew nicotine gum up to 2 hours before surgery. A nicotine patch is okay.  Note: Some surgeries require you to completely quit smoking and nicotine. Check with your surgeon.  Your care team will make every effort to keep you safe from infection. We will:  Clean our hands often with soap and water (or an alcohol-based hand rub).  Clean the skin at your surgery site with a special soap that kills germs.  Give you a special gown to keep you warm. (Cold raises the risk of infection.)  Wear special hair covers, masks, gowns and gloves during surgery.  Give antibiotic medicine, if prescribed. Not all surgeries need antibiotics.  What to bring on the day of surgery  Photo ID and insurance card  Copy of your health care directive, if you have one  Glasses and hearing aids (bring cases)  You can't wear contacts during surgery  Inhaler and eye drops, if you use them (tell us about these when you arrive)  CPAP machine or breathing device, if you use them  A few personal items, if spending the night  If you have . . .  A pacemaker, ICD (cardiac defibrillator) or other  implant: Bring the ID card.  An implanted stimulator: Bring the remote control.  A legal guardian: Bring a copy of the certified (court-stamped) guardianship papers.  Please remove any jewelry, including body piercings. Leave jewelry and other valuables at home.  If you're going home the day of surgery  You must have a responsible adult drive you home. They should stay with you overnight as well.  If you don't have someone to stay with you, and you aren't safe to go home alone, we may keep you overnight. Insurance often won't pay for this.  After surgery  If it's hard to control your pain or you need more pain medicine, please call your surgeon's office.  Questions?   If you have any questions for your care team, list them here: _________________________________________________________________________________________________________________________________________________________________________ ____________________________________ ____________________________________ ____________________________________  For informational purposes only. Not to replace the advice of your health care provider. Copyright   2003, 2019 [employer] [facility]. All rights reserved. Clinically reviewed by Brigette Jacob MD. [employer] 086590 - REV 12/22.    How to Take Your Medication Before Surgery  - Take all of your medications before surgery as usual

## 2024-04-17 NOTE — PROGRESS NOTES
Preoperative Evaluation  [address] [address]  Phone: [phone]  Fax: [phone]  Primary Provider: Shirin Duran  Pre-op Performing Provider: SHIRIN DURAN  Apr 17, 2024       Gabrielle is a 69 year old, presenting for the following:  Pre-Op Exam        4/17/2024     8:11 AM   Additional Questions   Roomed by Toy FOFANA CMA     Surgical Information  Surgery/Procedure: OSTEOTOMY SECOND METATARSAL BONE WITH HAMMERTOE CORRECTION   Surgery Location: [facility]  Surgeon: Dr. Keller  Surgery Date: 05/08/24  Time of Surgery: Unknown   Where patient plans to recover: At home with family  Fax number for surgical facility: Note does not need to be faxed, will be available electronically in [employer], through [employer]    Assessment & Plan     The proposed surgical procedure is considered INTERMEDIATE risk.    Preop general physical exam  Recommendations as below    Hammer toe of right foot  Chronic, not well controlled. Agree with procedure.     Acute sinusitis with symptoms > 10 days  Acute, poorly responsive to azithromycin. Discussed first line recommendations, to start augmentin.   - amoxicillin-clavulanate (AUGMENTIN) 875-125 MG tablet  Dispense: 14 tablet; Refill: 0    Difficulty concentrating  Chronic, stable.   - buPROPion (WELLBUTRIN XL) 150 MG 24 hr tablet  Dispense: 90 tablet; Refill: 1        Possible Sleep Apnea: less likely           - No identified additional risk factors other than previously addressed    Antiplatelet or Anticoagulation Medication Instructions   - Patient is on no antiplatelet or anticoagulation medications.    Additional Medication Instructions  Patient is to take all scheduled medications on the day of surgery    Recommendation  APPROVAL GIVEN to proceed with proposed procedure, without further diagnostic evaluation.    Prescription drug management      Subjective        HPI related to upcoming procedure:     Patient reports her pain in the right foot is not well controlled. When she stands, her large and second toe are raised and sometimes radiates pain through out the foot. She has tried wearing a boot with minimal relief.     58.2 points DASI   9.89 METs        4/17/2024     7:58 AM   Preop Questions   1. Have you ever had a heart attack or stroke? No   2. Have you ever had surgery on your heart or blood vessels, such as a stent placement, a coronary artery bypass, or surgery on an artery in your head, neck, heart, or legs? No   3. Do you have chest pain with activity? No   4. Do you have a history of  heart failure? No   5. Do you currently have a cold, bronchitis or symptoms of other infection? YES - current sinus infection   6. Do you have a cough, shortness of breath, or wheezing? YES - same   7. Do you or anyone in your family have previous history of blood clots? No   8. Do you or does anyone in your family have a serious bleeding problem such as prolonged bleeding following surgeries or cuts? No   9. Have you ever had problems with anemia or been told to take iron pills? No   10. Have you had any abnormal blood loss such as black, tarry or bloody stools, or abnormal vaginal bleeding? No   11. Have you ever had a blood transfusion? No   12. Are you willing to have a blood transfusion if it is medically needed before, during, or after your surgery? Yes   13. Have you or any of your relatives ever had problems with anesthesia? No   14. Do you have sleep apnea, excessive snoring or daytime drowsiness? YES - Patient denies headaches in AM, feels fatigued in the AM   14a. Do you have a CPAP machine? No   15. Do you have any artifical heart valves or other implanted medical devices like a pacemaker, defibrillator, or continuous glucose monitor? No   16. Do you have artificial joints? No   17. Are you allergic to latex? No       Health Care Directive  Patient does not have a  Health Care Directive or Living Will: Discussed advance care planning with patient; information given to patient to review.    Preoperative Review of    reviewed - no record of controlled substances prescribed.      Status of Chronic Conditions:  HYPERLIPIDEMIA - Patient has a long history of significant Hyperlipidemia requiring medication for treatment with recent good control. Patient reports no problems or side effects with the medication.     Patient Active Problem List    Diagnosis Date Noted    Sesamoiditis of right foot 05/17/2023     Priority: Medium     Formatting of this note might be different from the original.  Added automatically from request for surgery [id number]      Capsulitis of foot, right 05/17/2023     Priority: Medium     Formatting of this note might be different from the original.  Added automatically from request for surgery [id number]      Cortical age-related cataract of left eye 10/06/2021     Priority: Medium     Formatting of this note might be different from the original.  Added automatically from request for surgery [id number]      Cough 07/02/2018     Priority: Medium    Metatarsalgia of left foot 06/09/2017     Priority: Medium     Formatting of this note might be different from the original.  Added automatically from request for surgery [id number]      Hammertoe of left foot 06/09/2017     Priority: Medium     Formatting of this note might be different from the original.  Added automatically from request for surgery [id number]      Hallux valgus with bunions of left foot 06/09/2017     Priority: Medium     Formatting of this note might be different from the original.  Added automatically from request for surgery [id number]      Tobacco use disorder 04/13/2017     Priority: Medium    Hypertension      Priority: Medium     Created by Conversion  Replacement Utility updated for latest IMO load        Lower Back Pain      Priority: Medium     Created by Conversion        Lumbar Radiculopathy       Priority: Medium     Created by Conversion        Lump In / On The Skin      Priority: Medium     Created by Conversion          No past medical history on file.  Past Surgical History:   Procedure Laterality Date    BACK SURGERY      BUNIONECTOMY Bilateral     CHOLECYSTECTOMY      RELEASE CARPAL TUNNEL Bilateral     VAGOTOMY       Current Outpatient Medications   Medication Sig Dispense Refill    acetaminophen (TYLENOL) 500 MG tablet Take 500-1,000 mg by mouth      albuterol (PROAIR HFA;PROVENTIL HFA;VENTOLIN HFA) 90 mcg/actuation inhaler [ALBUTEROL (PROAIR HFA;PROVENTIL HFA;VENTOLIN HFA) 90 MCG/ACTUATION INHALER] INHALE 1 - 2 PUFFS BY MOUTH EVERY 4 - 6 HOURS AS NEEDED AND AS DIRECTED. 18 g 0    atorvastatin (LIPITOR) 20 MG tablet Take 1 tablet (20 mg) by mouth daily 90 tablet 1    azithromycin (ZITHROMAX) 250 MG tablet Take 2 tablets by mouth on Day 1. Then 1 tablet by mouth once daily on Days 2 through 5.  Repeat dose if needed starting on day 11.*      buPROPion (WELLBUTRIN XL) 150 MG 24 hr tablet Take 1 tablet (150 mg) by mouth every morning 90 tablet 0    ibuprofen (ADVIL/MOTRIN) 800 MG tablet TAKE ONE TABLET BY MOUTH THREE TIMES DAILY as needed 90 tablet 3    umeclidinium (INCRUSE ELLIPTA) 62.5 MCG/ACT inhaler Inhale 1 puff into the lungs daily 30 each 4    varenicline (CHANTIX) 1 MG tablet Take 1 Tablet by mouth two times a day. Take after eating with a full glass of water.* 60 tablet 3       No Known Allergies     Social History     Tobacco Use    Smoking status: Every Day    Smokeless tobacco: Never    Tobacco comments:     cutting back 1/25/2016   Substance Use Topics    Alcohol use: Yes     Alcohol/week: 4.0 standard drinks of alcohol     History reviewed. No pertinent family history.  History   Drug Use No         Review of Systems    Review of Systems  Constitutional, HEENT, cardiovascular, pulmonary, gi and gu systems are negative, except as otherwise noted.    Objective    /82 (BP Location:  "Right arm, Patient Position: Sitting, Cuff Size: Adult Regular)   Pulse 89   Temp 98.4  F (36.9  C) (Oral)   Resp 17   Ht 1.626 m (5' 4\")   Wt 73 kg (161 lb)   LMP  (LMP Unknown)   SpO2 97%   BMI 27.64 kg/m     Estimated body mass index is 27.64 kg/m  as calculated from the following:    Height as of this encounter: 1.626 m (5' 4\").    Weight as of this encounter: 73 kg (161 lb).  Physical Exam  GENERAL: alert and no distress  EYES: Eyes grossly normal to inspection, PERRL and conjunctivae and sclerae normal  HENT: ear canals and TM's normal, nose and mouth without ulcers or lesions  NECK: no adenopathy, no asymmetry, masses, or scars  RESP: lungs clear to auscultation - no rales, rhonchi or wheezes and decreased percussion at the posterior right lung base   CV: regular rate and rhythm, normal S1 S2, no S3 or S4, no murmur, click or rub, no peripheral edema  ABDOMEN: soft, nontender, no hepatosplenomegaly, no masses and bowel sounds normal  MS: no gross musculoskeletal defects noted, no edema  SKIN: no suspicious lesions or rashes  NEURO: Normal strength and tone, mentation intact and speech normal  PSYCH: mentation appears normal, affect normal/bright    Recent Labs   Lab Test 05/25/23  0914 07/18/22  1128   HGB 14.0 14.3    329     --    POTASSIUM 3.5  --    CR 0.67  --         Diagnostics  No labs were ordered during this visit.   No EKG required, no history of coronary heart disease, significant arrhythmia, peripheral arterial disease or other structural heart disease.    Revised Cardiac Risk Index (RCRI)  The patient has the following serious cardiovascular risks for perioperative complications:   - No serious cardiac risks = 0 points     RCRI Interpretation: 1 point: Class II (low risk - 0.9% complication rate)         Signed Electronically by: SHIRIN BANKS DO  Copy of this evaluation report is provided to requesting physician.       "

## 2024-04-25 ENCOUNTER — TELEPHONE (OUTPATIENT)
Dept: FAMILY MEDICINE | Facility: CLINIC | Age: 69
End: 2024-04-25
Payer: COMMERCIAL

## 2024-04-25 DIAGNOSIS — J01.91 ACUTE RECURRENT SINUSITIS, UNSPECIFIED LOCATION: Primary | ICD-10-CM

## 2024-04-25 DIAGNOSIS — J01.90 ACUTE SINUSITIS WITH SYMPTOMS > 10 DAYS: ICD-10-CM

## 2024-04-25 NOTE — TELEPHONE ENCOUNTER
"Pt calling clinic to report her sx are not improved. Pt states she completed pre-op on 4/17 and has procedure on 5/8. Pt states she has just completed Augmentin and is still not improved. Pt reports \"feels worse.\" Pt requesting Z-pack. Pt states Z-pack usually helps her best.     Writer informed would send this update to PCP and reach back out to the pt with PCP recommendations.     Pt thanked for the call.   "

## 2024-04-26 ENCOUNTER — TELEPHONE (OUTPATIENT)
Dept: FAMILY MEDICINE | Facility: CLINIC | Age: 69
End: 2024-04-26
Payer: COMMERCIAL

## 2024-04-26 NOTE — TELEPHONE ENCOUNTER
"Relayed Dr. Jessica's message. Pt refusing the CT and ENT referral at this time \" I don't have time I am leaving town in an hour and have surgery upcoming. I just want an antibiotic to kick this thing.\"    Pt states she will \"figure it out on my own then.\" And hung up on writer  "

## 2024-04-26 NOTE — TELEPHONE ENCOUNTER
Called pt back and relayed PCP's message below. Pt states she is busy getting ready for her surgery and does not have the time to come back in. Pt states if PCP does not want her taking zpack then she is willing to try another dose of of Amox/Augmentin. Pt declined to be seen in clinic again for re-evaluation. Would like message sent to PCP if willing to send in abx.     Informed would send message to PCP.

## 2024-04-26 NOTE — TELEPHONE ENCOUNTER
Relayed Dr. Jessica's message. Pt states she cannot get her surgery with her current symptoms and she will not be able to wait until 5/3. Pt states she is just wanting an antibiotic so she can get her surgery. Relayed all prior messages, pt states again she only needs an antibiotic

## 2024-04-26 NOTE — TELEPHONE ENCOUNTER
Relayed Dr. Jessica's message. Pt states will go to [facility] on Monday as she is busy this weekend. Writer gave pt [facility] hours

## 2024-04-26 NOTE — TELEPHONE ENCOUNTER
Since I believe this is her second or third round of antibiotics, she will need to get imaging and see ENT.     SHIRIN BANKS, DO

## 2024-04-26 NOTE — TELEPHONE ENCOUNTER
As stated several times before, if she would like antibiotics, please go to urgent care as she will need re-evaluation.

## 2024-04-26 NOTE — TELEPHONE ENCOUNTER
Would recommend against azithromycin as that was the patient's initial antibiotic prior to taking the augmentin. They can go into urgent care for re-evaluation if she would like.     SHIRIN BANKS, DO

## 2024-04-26 NOTE — TELEPHONE ENCOUNTER
5-3 is actually a reasonable date. If the patient needs it stat then she needs to be re-evaluated. This should not prohibit any surgery.

## 2024-04-26 NOTE — TELEPHONE ENCOUNTER
Reason for Call:  Appointment Request    Patient requesting this type of appt:  Nasal CT scan     Requested provider:  imaging    Reason patient unable to be scheduled: Needs to be scheduled by clinic    When does patient want to be seen/preferred time:  asap    Comments: patient called imaging and the soonest they could get her in was 5-3. She will need it much sooner and was told to contact Dr Jessica to get her in high priority    Could we send this information to you in [facility] or would you prefer to receive a phone call?:   Patient would prefer a phone call   Okay to leave a detailed message?: Yes at Home number on file [phone] (home)    Call taken on 4/26/2024 at 2:12 PM by Chantal Sheffield

## 2024-05-11 ENCOUNTER — HEALTH MAINTENANCE LETTER (OUTPATIENT)
Age: 69
End: 2024-05-11

## 2025-01-27 PROBLEM — R05.9 COUGH: Status: RESOLVED | Noted: 2018-07-02 | Resolved: 2025-01-27

## 2025-01-27 NOTE — PROGRESS NOTES
Assessment/Plan:    Establishing care with new doctor, encounter for  Establishing care today, all past medical history reviewed and updated in EMR.  At this time patient declines lung cancer screening and colonoscopy.  She is doing mammogram at work.  Declines vaccines today.    Essential hypertension  History of hypertension on her chart but it does not sound like she was on antihypertensives in the past.  Blood pressure significantly elevated today, will start lisinopril 10 mg daily, home blood pressure cuff provided, discussed goal blood pressure less than 140/90.  Labs as below.  - BASIC METABOLIC PANEL  - CBC with platelets  - lisinopril (ZESTRIL) 10 MG tablet  Dispense: 90 tablet; Refill: 1  - Home Blood Pressure Monitor Order for DME - ONLY FOR DME    Asymptomatic postmenopausal status  Patient due for bone density scan, DEXA ordered today.  - Vitamin D deficiency screening  - DX Bone Density    Need for hepatitis C screening test  Due for one-time hepatitis C screening.  - Hepatitis C Screen Reflex to HCV RNA Quant and Genotype    Lipid screening  Given age, due for lipid panel.  - Lipid panel reflex to direct LDL Non-fasting    Diabetes mellitus screening  Given age, will screen for diabetes today.  - Hemoglobin A1c    Fatigue, unspecified type  Patient ports issues with fatigue, will check TSH with labs today.  - TSH with free T4 reflex    Leg cramps  Patient reports some issues with leg cramps, will check labs today.  Discussed potential for PAD, could consider NELY in the future.  - Magnesium       Follow up: pending test results/home BP readings    Claudia Light MD  [facility]    Subjective:   Gabrielle Rod is a 69 year old female is here today for establish care    -hx HTN - family hx of this (mom, sister) - no hx medications  -doesn't do antidepressants, doesn't feel depressed - still very active, working - but does get some low energy  -takes gabapentin for her foot - 2 in morning  makes her fine all day, also with tylenol  -R shoulder pain  -wants to quit smoking - can't do chantix or wellbutrin - discussed lung cancer screening CT and pt declines at this time  -not ready to do colonoscopy  -doing mammogram at work  -bilateral calf pain with exertion    Patient Active Problem List   Diagnosis    Hypertension    Lower Back Pain    Lumbar Radiculopathy    Tobacco use disorder    Metatarsalgia of left foot    Hammertoe of left foot    Hallux valgus with bunions of left foot    Cortical age-related cataract of left eye    Sesamoiditis of right foot    Capsulitis of foot, right     History reviewed. No pertinent past medical history.  Past Surgical History:   Procedure Laterality Date    BACK SURGERY      BUNIONECTOMY Bilateral     CHOLECYSTECTOMY      RELEASE CARPAL TUNNEL Bilateral     VAGOTOMY       Current Outpatient Medications   Medication Sig Dispense Refill    gabapentin (NEURONTIN) 300 MG capsule Take 300 mg by mouth.      lisinopril (ZESTRIL) 10 MG tablet Take 1 tablet (10 mg) by mouth daily. 90 tablet 1    Melatonin 10 MG TABS tablet Take 10 mg by mouth nightly as needed for sleep.      Vitamin D3 (VITAMIN D, CHOLECALCIFEROL,) 25 mcg (1000 units) tablet Take by mouth daily.       No current facility-administered medications for this visit.     No Known Allergies  Social History     Socioeconomic History    Marital status:      Spouse name: Not on file    Number of children: Not on file    Years of education: Not on file    Highest education level: Not on file   Occupational History    Not on file   Tobacco Use    Smoking status: Every Day     Current packs/day: 0.25     Average packs/day: 0.9 packs/day for 51.1 years (46.5 ttl pk-yrs)     Types: Cigarettes     Start date: 1974    Smokeless tobacco: Never    Tobacco comments:     cutting back 1/25/2016   Substance and Sexual Activity    Alcohol use: Yes     Alcohol/week: 4.0 standard drinks of alcohol    Drug use: No    Sexual  "activity: Not on file   Other Topics Concern    Not on file   Social History Narrative    Not on file     Social Drivers of Health     Financial Resource Strain: Low Risk  (12/6/2023)    Financial Resource Strain     Within the past 12 months, have you or your family members you live with been unable to get utilities (heat, electricity) when it was really needed?: No   Food Insecurity: Low Risk  (12/6/2023)    Food Insecurity     Within the past 12 months, did you worry that your food would run out before you got money to buy more?: No     Within the past 12 months, did the food you bought just not last and you didn t have money to get more?: No   Transportation Needs: Low Risk  (12/6/2023)    Transportation Needs     Within the past 12 months, has lack of transportation kept you from medical appointments, getting your medicines, non-medical meetings or appointments, work, or from getting things that you need?: No   Physical Activity: Not on file   Stress: Not on file   Social Connections: Not on file   Interpersonal Safety: Unknown (5/2/2024)    Received from [facility]    Humiliation, Afraid, Rape, and Kick questionnaire     Fear of Current or Ex-Partner: Not on file     Emotionally Abused: Not on file     Physically Abused: No     Sexually Abused: No   Housing Stability: Low Risk  (12/6/2023)    Housing Stability     Do you have housing? : Yes     Are you worried about losing your housing?: No     Family History   Problem Relation Age of Onset    Cerebrovascular Disease Mother     Hypertension Mother     Diverticulitis Mother     Glaucoma Mother     Obesity Mother     Pancreatic Cancer Father         metastatic    Hypertension Sister     Obesity Sister     Diabetes Brother     Obesity Brother      Review of systems is as stated in HPI, and the remainder of system review is otherwise negative.    Objective:     BP (!) 189/103   Pulse 87   Temp 97  F (36.1  C)   Resp 20   Ht 1.625 m (5' 3.96\")   Wt 71.3 kg " (157 lb 4 oz)   LMP  (LMP Unknown)   SpO2 99%   BMI 27.03 kg/m      General appearance: awake, NAD  HEENT: atraumatic, normocephalic, no scleral icterus or injection  CV: RRR, no murmurs/rubs/gallops, normal S1 and S2  Lungs: CTAB, no wheezes or crackles, breathing comfortably on room air  Extremities: no LE edema bilaterally, moving all extremities  Neuro: alert, oriented x3, CNs grossly intact, no focal deficits appreciated  Psych: normal mood/affect/behavior, answering questions appropriately, linear thought process

## 2025-01-30 ENCOUNTER — OFFICE VISIT (OUTPATIENT)
Dept: FAMILY MEDICINE | Facility: CLINIC | Age: 70
End: 2025-01-30
Payer: COMMERCIAL

## 2025-01-30 VITALS
OXYGEN SATURATION: 99 % | DIASTOLIC BLOOD PRESSURE: 85 MMHG | WEIGHT: 157.25 LBS | BODY MASS INDEX: 26.85 KG/M2 | HEART RATE: 87 BPM | SYSTOLIC BLOOD PRESSURE: 151 MMHG | TEMPERATURE: 97 F | RESPIRATION RATE: 20 BRPM | HEIGHT: 64 IN

## 2025-01-30 DIAGNOSIS — Z11.59 NEED FOR HEPATITIS C SCREENING TEST: ICD-10-CM

## 2025-01-30 DIAGNOSIS — R53.83 FATIGUE, UNSPECIFIED TYPE: ICD-10-CM

## 2025-01-30 DIAGNOSIS — Z13.1 DIABETES MELLITUS SCREENING: ICD-10-CM

## 2025-01-30 DIAGNOSIS — R25.2 LEG CRAMPS: ICD-10-CM

## 2025-01-30 DIAGNOSIS — I10 ESSENTIAL HYPERTENSION: ICD-10-CM

## 2025-01-30 DIAGNOSIS — Z78.0 ASYMPTOMATIC POSTMENOPAUSAL STATUS: ICD-10-CM

## 2025-01-30 DIAGNOSIS — Z76.89 ESTABLISHING CARE WITH NEW DOCTOR, ENCOUNTER FOR: Primary | ICD-10-CM

## 2025-01-30 DIAGNOSIS — Z13.220 LIPID SCREENING: ICD-10-CM

## 2025-01-30 LAB
ANION GAP SERPL CALCULATED.3IONS-SCNC: 12 MMOL/L (ref 7–15)
BUN SERPL-MCNC: 10.6 MG/DL (ref 8–23)
CALCIUM SERPL-MCNC: 9.6 MG/DL (ref 8.8–10.4)
CHLORIDE SERPL-SCNC: 107 MMOL/L (ref 98–107)
CHOLEST SERPL-MCNC: 195 MG/DL
CREAT SERPL-MCNC: 0.68 MG/DL (ref 0.51–0.95)
EGFRCR SERPLBLD CKD-EPI 2021: >90 ML/MIN/1.73M2
ERYTHROCYTE [DISTWIDTH] IN BLOOD BY AUTOMATED COUNT: 13.7 % (ref 10–15)
EST. AVERAGE GLUCOSE BLD GHB EST-MCNC: 100 MG/DL
FASTING STATUS PATIENT QL REPORTED: YES
FASTING STATUS PATIENT QL REPORTED: YES
GLUCOSE SERPL-MCNC: 76 MG/DL (ref 70–99)
HBA1C MFR BLD: 5.1 % (ref 0–5.6)
HCO3 SERPL-SCNC: 22 MMOL/L (ref 22–29)
HCT VFR BLD AUTO: 41.2 % (ref 35–47)
HCV AB SERPL QL IA: NONREACTIVE
HDLC SERPL-MCNC: 65 MG/DL
HGB BLD-MCNC: 13.7 G/DL (ref 11.7–15.7)
LDLC SERPL CALC-MCNC: 110 MG/DL
MAGNESIUM SERPL-MCNC: 2.1 MG/DL (ref 1.7–2.3)
MCH RBC QN AUTO: 30.9 PG (ref 26.5–33)
MCHC RBC AUTO-ENTMCNC: 33.3 G/DL (ref 31.5–36.5)
MCV RBC AUTO: 93 FL (ref 78–100)
NONHDLC SERPL-MCNC: 130 MG/DL
PLATELET # BLD AUTO: 386 10E3/UL (ref 150–450)
POTASSIUM SERPL-SCNC: 4 MMOL/L (ref 3.4–5.3)
RBC # BLD AUTO: 4.44 10E6/UL (ref 3.8–5.2)
SODIUM SERPL-SCNC: 141 MMOL/L (ref 135–145)
TRIGL SERPL-MCNC: 98 MG/DL
TSH SERPL DL<=0.005 MIU/L-ACNC: 1.41 UIU/ML (ref 0.3–4.2)
VIT D+METAB SERPL-MCNC: 25 NG/ML (ref 20–50)
WBC # BLD AUTO: 8.9 10E3/UL (ref 4–11)

## 2025-01-30 RX ORDER — GABAPENTIN 300 MG/1
300 CAPSULE ORAL
COMMUNITY
Start: 2024-08-23 | End: 2025-08-23

## 2025-01-30 RX ORDER — PHENOL 1.4 %
10 AEROSOL, SPRAY (ML) MUCOUS MEMBRANE
COMMUNITY

## 2025-01-30 RX ORDER — LISINOPRIL 10 MG/1
10 TABLET ORAL DAILY
Qty: 90 TABLET | Refills: 1 | Status: SHIPPED | OUTPATIENT
Start: 2025-01-30

## 2025-01-30 RX ORDER — VITAMIN B COMPLEX
TABLET ORAL DAILY
COMMUNITY

## 2025-01-30 ASSESSMENT — PAIN SCALES - GENERAL: PAINLEVEL_OUTOF10: NO PAIN (0)

## 2025-05-16 ENCOUNTER — APPOINTMENT (OUTPATIENT)
Dept: SPEECH THERAPY | Facility: HOSPITAL | Age: 70
DRG: 062 | End: 2025-05-16
Attending: INTERNAL MEDICINE
Payer: COMMERCIAL

## 2025-05-16 ENCOUNTER — APPOINTMENT (OUTPATIENT)
Dept: CARDIOLOGY | Facility: HOSPITAL | Age: 70
DRG: 062 | End: 2025-05-16
Attending: INTERNAL MEDICINE
Payer: COMMERCIAL

## 2025-05-16 ENCOUNTER — HOSPITAL ENCOUNTER (INPATIENT)
Facility: HOSPITAL | Age: 70
LOS: 3 days | Discharge: HOME | DRG: 062 | End: 2025-05-19
Attending: EMERGENCY MEDICINE | Admitting: INTERNAL MEDICINE
Payer: COMMERCIAL

## 2025-05-16 ENCOUNTER — APPOINTMENT (OUTPATIENT)
Dept: MRI IMAGING | Facility: HOSPITAL | Age: 70
DRG: 062 | End: 2025-05-16
Attending: INTERNAL MEDICINE
Payer: COMMERCIAL

## 2025-05-16 ENCOUNTER — APPOINTMENT (OUTPATIENT)
Dept: CT IMAGING | Facility: HOSPITAL | Age: 70
DRG: 062 | End: 2025-05-16
Attending: EMERGENCY MEDICINE
Payer: COMMERCIAL

## 2025-05-16 ENCOUNTER — APPOINTMENT (OUTPATIENT)
Dept: NEUROLOGY | Facility: HOSPITAL | Age: 70
DRG: 062 | End: 2025-05-16
Attending: PSYCHIATRY & NEUROLOGY
Payer: COMMERCIAL

## 2025-05-16 DIAGNOSIS — R29.90 STROKE-LIKE SYMPTOMS: ICD-10-CM

## 2025-05-16 DIAGNOSIS — Z71.89 OTHER SPECIFIED COUNSELING: Chronic | ICD-10-CM

## 2025-05-16 DIAGNOSIS — I63.9 ACUTE CVA (CEREBROVASCULAR ACCIDENT) (H): Primary | ICD-10-CM

## 2025-05-16 LAB
ANION GAP SERPL CALCULATED.3IONS-SCNC: 10 MMOL/L (ref 7–15)
APTT PPP: 24 SECONDS (ref 22–38)
BASOPHILS # BLD AUTO: 0.1 10E3/UL (ref 0–0.2)
BASOPHILS NFR BLD AUTO: 1 %
BUN SERPL-MCNC: 10.8 MG/DL (ref 8–23)
CALCIUM SERPL-MCNC: 9.2 MG/DL (ref 8.8–10.4)
CHLORIDE SERPL-SCNC: 102 MMOL/L (ref 98–107)
CHOLEST SERPL-MCNC: 212 MG/DL
CREAT SERPL-MCNC: 0.84 MG/DL (ref 0.51–0.95)
EGFRCR SERPLBLD CKD-EPI 2021: 74 ML/MIN/1.73M2
EOSINOPHIL # BLD AUTO: 0.1 10E3/UL (ref 0–0.7)
EOSINOPHIL NFR BLD AUTO: 1 %
ERYTHROCYTE [DISTWIDTH] IN BLOOD BY AUTOMATED COUNT: 13.5 % (ref 10–15)
EST. AVERAGE GLUCOSE BLD GHB EST-MCNC: 111 MG/DL
GLUCOSE BLDC GLUCOMTR-MCNC: 80 MG/DL (ref 70–99)
GLUCOSE BLDC GLUCOMTR-MCNC: 86 MG/DL (ref 70–99)
GLUCOSE BLDC GLUCOMTR-MCNC: 86 MG/DL (ref 70–99)
GLUCOSE BLDC GLUCOMTR-MCNC: 99 MG/DL (ref 70–99)
GLUCOSE SERPL-MCNC: 86 MG/DL (ref 70–99)
HBA1C MFR BLD: 5.5 %
HCO3 SERPL-SCNC: 26 MMOL/L (ref 22–29)
HCT VFR BLD AUTO: 46.8 % (ref 35–47)
HDLC SERPL-MCNC: 60 MG/DL
HGB BLD-MCNC: 15.5 G/DL (ref 11.7–15.7)
IMM GRANULOCYTES # BLD: 0 10E3/UL
IMM GRANULOCYTES NFR BLD: 0 %
INR PPP: 0.95 (ref 0.85–1.15)
LDLC SERPL CALC-MCNC: 122 MG/DL
LVEF ECHO: NORMAL
LYMPHOCYTES # BLD AUTO: 2.8 10E3/UL (ref 0.8–5.3)
LYMPHOCYTES NFR BLD AUTO: 26 %
MCH RBC QN AUTO: 30.7 PG (ref 26.5–33)
MCHC RBC AUTO-ENTMCNC: 33.1 G/DL (ref 31.5–36.5)
MCV RBC AUTO: 93 FL (ref 78–100)
MONOCYTES # BLD AUTO: 0.6 10E3/UL (ref 0–1.3)
MONOCYTES NFR BLD AUTO: 6 %
NEUTROPHILS # BLD AUTO: 7 10E3/UL (ref 1.6–8.3)
NEUTROPHILS NFR BLD AUTO: 66 %
NONHDLC SERPL-MCNC: 152 MG/DL
NRBC # BLD AUTO: 0 10E3/UL
NRBC BLD AUTO-RTO: 0 /100
PLATELET # BLD AUTO: 407 10E3/UL (ref 150–450)
POTASSIUM SERPL-SCNC: 4.1 MMOL/L (ref 3.4–5.3)
PROTHROMBIN TIME: 12.9 SECONDS (ref 11.8–14.8)
RBC # BLD AUTO: 5.05 10E6/UL (ref 3.8–5.2)
SODIUM SERPL-SCNC: 138 MMOL/L (ref 135–145)
TRIGL SERPL-MCNC: 149 MG/DL
TROPONIN T SERPL HS-MCNC: <6 NG/L
TROPONIN T SERPL HS-MCNC: <6 NG/L
WBC # BLD AUTO: 10.6 10E3/UL (ref 4–11)

## 2025-05-16 PROCEDURE — 95813 EEG EXTND MNTR 61-119 MIN: CPT

## 2025-05-16 PROCEDURE — 36415 COLL VENOUS BLD VENIPUNCTURE: CPT | Performed by: EMERGENCY MEDICINE

## 2025-05-16 PROCEDURE — 82962 GLUCOSE BLOOD TEST: CPT

## 2025-05-16 PROCEDURE — 258N000003 HC RX IP 258 OP 636: Performed by: EMERGENCY MEDICINE

## 2025-05-16 PROCEDURE — 250N000013 HC RX MED GY IP 250 OP 250 PS 637: Performed by: INTERNAL MEDICINE

## 2025-05-16 PROCEDURE — 99291 CRITICAL CARE FIRST HOUR: CPT | Mod: 25

## 2025-05-16 PROCEDURE — 258N000003 HC RX IP 258 OP 636: Performed by: PSYCHIATRY & NEUROLOGY

## 2025-05-16 PROCEDURE — 96374 THER/PROPH/DIAG INJ IV PUSH: CPT | Mod: 59

## 2025-05-16 PROCEDURE — 95813 EEG EXTND MNTR 61-119 MIN: CPT | Mod: 26 | Performed by: PSYCHIATRY & NEUROLOGY

## 2025-05-16 PROCEDURE — A9585 GADOBUTROL INJECTION: HCPCS | Performed by: INTERNAL MEDICINE

## 2025-05-16 PROCEDURE — 92610 EVALUATE SWALLOWING FUNCTION: CPT | Mod: GN

## 2025-05-16 PROCEDURE — 70496 CT ANGIOGRAPHY HEAD: CPT

## 2025-05-16 PROCEDURE — G0508 CRIT CARE TELEHEA CONSULT 60: HCPCS | Mod: G0 | Performed by: PSYCHIATRY & NEUROLOGY

## 2025-05-16 PROCEDURE — 85004 AUTOMATED DIFF WBC COUNT: CPT | Performed by: EMERGENCY MEDICINE

## 2025-05-16 PROCEDURE — 84484 ASSAY OF TROPONIN QUANT: CPT | Performed by: EMERGENCY MEDICINE

## 2025-05-16 PROCEDURE — 85730 THROMBOPLASTIN TIME PARTIAL: CPT | Performed by: EMERGENCY MEDICINE

## 2025-05-16 PROCEDURE — 92523 SPEECH SOUND LANG COMPREHEN: CPT | Mod: GN

## 2025-05-16 PROCEDURE — 250N000011 HC RX IP 250 OP 636: Performed by: PSYCHIATRY & NEUROLOGY

## 2025-05-16 PROCEDURE — 99222 1ST HOSP IP/OBS MODERATE 55: CPT | Performed by: PSYCHIATRY & NEUROLOGY

## 2025-05-16 PROCEDURE — 200N000001 HC R&B ICU

## 2025-05-16 PROCEDURE — 255N000002 HC RX 255 OP 636: Performed by: INTERNAL MEDICINE

## 2025-05-16 PROCEDURE — 250N000009 HC RX 250: Performed by: EMERGENCY MEDICINE

## 2025-05-16 PROCEDURE — 80048 BASIC METABOLIC PNL TOTAL CA: CPT | Performed by: EMERGENCY MEDICINE

## 2025-05-16 PROCEDURE — 83036 HEMOGLOBIN GLYCOSYLATED A1C: CPT | Performed by: INTERNAL MEDICINE

## 2025-05-16 PROCEDURE — 36415 COLL VENOUS BLD VENIPUNCTURE: CPT | Performed by: INTERNAL MEDICINE

## 2025-05-16 PROCEDURE — 82465 ASSAY BLD/SERUM CHOLESTEROL: CPT | Performed by: INTERNAL MEDICINE

## 2025-05-16 PROCEDURE — 250N000011 HC RX IP 250 OP 636: Performed by: EMERGENCY MEDICINE

## 2025-05-16 PROCEDURE — 99223 1ST HOSP IP/OBS HIGH 75: CPT | Performed by: INTERNAL MEDICINE

## 2025-05-16 PROCEDURE — 85610 PROTHROMBIN TIME: CPT | Performed by: EMERGENCY MEDICINE

## 2025-05-16 PROCEDURE — 93306 TTE W/DOPPLER COMPLETE: CPT | Mod: 26 | Performed by: STUDENT IN AN ORGANIZED HEALTH CARE EDUCATION/TRAINING PROGRAM

## 2025-05-16 PROCEDURE — 93005 ELECTROCARDIOGRAM TRACING: CPT | Performed by: EMERGENCY MEDICINE

## 2025-05-16 PROCEDURE — 3E03317 INTRODUCTION OF OTHER THROMBOLYTIC INTO PERIPHERAL VEIN, PERCUTANEOUS APPROACH: ICD-10-PCS | Performed by: PSYCHIATRY & NEUROLOGY

## 2025-05-16 PROCEDURE — 84484 ASSAY OF TROPONIN QUANT: CPT | Performed by: INTERNAL MEDICINE

## 2025-05-16 PROCEDURE — 70553 MRI BRAIN STEM W/O & W/DYE: CPT

## 2025-05-16 RX ORDER — GABAPENTIN 300 MG/1
300 CAPSULE ORAL EVERY EVENING
Status: DISCONTINUED | OUTPATIENT
Start: 2025-05-16 | End: 2025-05-19 | Stop reason: HOSPADM

## 2025-05-16 RX ORDER — ALBUTEROL SULFATE 90 UG/1
1-2 INHALANT RESPIRATORY (INHALATION) EVERY 4 HOURS PRN
Status: DISCONTINUED | OUTPATIENT
Start: 2025-05-16 | End: 2025-05-19 | Stop reason: HOSPADM

## 2025-05-16 RX ORDER — ONDANSETRON 4 MG/1
4 TABLET, ORALLY DISINTEGRATING ORAL EVERY 6 HOURS PRN
Status: DISCONTINUED | OUTPATIENT
Start: 2025-05-16 | End: 2025-05-19 | Stop reason: HOSPADM

## 2025-05-16 RX ORDER — ACETAMINOPHEN 500 MG
500-1000 TABLET ORAL DAILY
COMMUNITY

## 2025-05-16 RX ORDER — LIDOCAINE 40 MG/G
CREAM TOPICAL
Status: DISCONTINUED | OUTPATIENT
Start: 2025-05-16 | End: 2025-05-19 | Stop reason: HOSPADM

## 2025-05-16 RX ORDER — SODIUM CHLORIDE 9 MG/ML
INJECTION, SOLUTION INTRAVENOUS CONTINUOUS PRN
Status: DISCONTINUED | OUTPATIENT
Start: 2025-05-16 | End: 2025-05-16

## 2025-05-16 RX ORDER — HYDRALAZINE HYDROCHLORIDE 20 MG/ML
10 INJECTION INTRAMUSCULAR; INTRAVENOUS EVERY 30 MIN PRN
Status: DISCONTINUED | OUTPATIENT
Start: 2025-05-16 | End: 2025-05-19 | Stop reason: HOSPADM

## 2025-05-16 RX ORDER — ONDANSETRON 2 MG/ML
4 INJECTION INTRAMUSCULAR; INTRAVENOUS EVERY 6 HOURS PRN
Status: DISCONTINUED | OUTPATIENT
Start: 2025-05-16 | End: 2025-05-19 | Stop reason: HOSPADM

## 2025-05-16 RX ORDER — IOPAMIDOL 755 MG/ML
67 INJECTION, SOLUTION INTRAVASCULAR ONCE
Status: COMPLETED | OUTPATIENT
Start: 2025-05-16 | End: 2025-05-16

## 2025-05-16 RX ORDER — LISINOPRIL 5 MG/1
10 TABLET ORAL DAILY
Status: DISCONTINUED | OUTPATIENT
Start: 2025-05-17 | End: 2025-05-19 | Stop reason: HOSPADM

## 2025-05-16 RX ORDER — ALBUTEROL SULFATE 90 UG/1
1-2 INHALANT RESPIRATORY (INHALATION) EVERY 4 HOURS PRN
COMMUNITY
Start: 2025-03-04

## 2025-05-16 RX ORDER — GADOBUTROL 604.72 MG/ML
7 INJECTION INTRAVENOUS ONCE
Status: COMPLETED | OUTPATIENT
Start: 2025-05-16 | End: 2025-05-16

## 2025-05-16 RX ORDER — GABAPENTIN 300 MG/1
300 CAPSULE ORAL EVERY EVENING
COMMUNITY

## 2025-05-16 RX ORDER — ATORVASTATIN CALCIUM 40 MG/1
40 TABLET, FILM COATED ORAL EVERY EVENING
Status: DISCONTINUED | OUTPATIENT
Start: 2025-05-16 | End: 2025-05-19 | Stop reason: HOSPADM

## 2025-05-16 RX ORDER — DILTIAZEM HYDROCHLORIDE 60 MG/1
2 TABLET, FILM COATED ORAL DAILY
COMMUNITY
Start: 2025-04-14

## 2025-05-16 RX ORDER — FLUTICASONE PROPIONATE 50 MCG
2 SPRAY, SUSPENSION (ML) NASAL DAILY PRN
COMMUNITY
Start: 2025-02-06

## 2025-05-16 RX ORDER — LABETALOL HYDROCHLORIDE 5 MG/ML
10 INJECTION, SOLUTION INTRAVENOUS EVERY 10 MIN PRN
Status: DISCONTINUED | OUTPATIENT
Start: 2025-05-16 | End: 2025-05-19 | Stop reason: HOSPADM

## 2025-05-16 RX ORDER — FLUTICASONE FUROATE AND VILANTEROL 100; 25 UG/1; UG/1
1 POWDER RESPIRATORY (INHALATION) DAILY
Status: DISCONTINUED | OUTPATIENT
Start: 2025-05-17 | End: 2025-05-19 | Stop reason: HOSPADM

## 2025-05-16 RX ORDER — SODIUM CHLORIDE 9 MG/ML
INJECTION, SOLUTION INTRAVENOUS CONTINUOUS
Status: DISCONTINUED | OUTPATIENT
Start: 2025-05-16 | End: 2025-05-17

## 2025-05-16 RX ORDER — GABAPENTIN 300 MG/1
600 CAPSULE ORAL EVERY MORNING
Status: DISCONTINUED | OUTPATIENT
Start: 2025-05-17 | End: 2025-05-19 | Stop reason: HOSPADM

## 2025-05-16 RX ADMIN — SODIUM CHLORIDE 500 ML: 0.9 INJECTION, SOLUTION INTRAVENOUS at 11:32

## 2025-05-16 RX ADMIN — SODIUM CHLORIDE: 0.9 INJECTION, SOLUTION INTRAVENOUS at 21:51

## 2025-05-16 RX ADMIN — IOPAMIDOL 67 ML: 755 INJECTION, SOLUTION INTRAVENOUS at 09:53

## 2025-05-16 RX ADMIN — SODIUM CHLORIDE 100 ML: 9 INJECTION, SOLUTION INTRAVENOUS at 09:53

## 2025-05-16 RX ADMIN — GADOBUTROL 7 ML: 604.72 INJECTION INTRAVENOUS at 18:19

## 2025-05-16 RX ADMIN — PERFLUTREN 4 ML: 6.52 INJECTION, SUSPENSION INTRAVENOUS at 14:30

## 2025-05-16 RX ADMIN — ATORVASTATIN CALCIUM 40 MG: 40 TABLET, FILM COATED ORAL at 20:31

## 2025-05-16 RX ADMIN — Medication 18 MG: at 10:39

## 2025-05-16 ASSESSMENT — ACTIVITIES OF DAILY LIVING (ADL)
ADLS_ACUITY_SCORE: 22
ADLS_ACUITY_SCORE: 44
ADLS_ACUITY_SCORE: 41
CHANGE_IN_FUNCTIONAL_STATUS_SINCE_ONSET_OF_CURRENT_ILLNESS/INJURY: YES
CONCENTRATING,_REMEMBERING_OR_MAKING_DECISIONS_DIFFICULTY: NO
DIFFICULTY_COMMUNICATING: NO
WALKING_OR_CLIMBING_STAIRS_DIFFICULTY: NO
ADLS_ACUITY_SCORE: 41
VISION_MANAGEMENT: GLASSES
ADLS_ACUITY_SCORE: 41
ADLS_ACUITY_SCORE: 22
WEAR_GLASSES_OR_BLIND: YES
ADLS_ACUITY_SCORE: 22
TOILETING_ISSUES: NO
ADLS_ACUITY_SCORE: 41
DOING_ERRANDS_INDEPENDENTLY_DIFFICULTY: NO
FALL_HISTORY_WITHIN_LAST_SIX_MONTHS: NO
ADLS_ACUITY_SCORE: 41
DIFFICULTY_EATING/SWALLOWING: NO
HEARING_DIFFICULTY_OR_DEAF: NO
DRESSING/BATHING_DIFFICULTY: NO
ADLS_ACUITY_SCORE: 22
ADLS_ACUITY_SCORE: 22
ADLS_ACUITY_SCORE: 44
ADLS_ACUITY_SCORE: 41
ADLS_ACUITY_SCORE: 45

## 2025-05-16 NOTE — PHARMACY-ADMISSION MEDICATION HISTORY
Pharmacist Admission Medication History    Admission medication history is complete. The information provided in this note is only as accurate as the sources available at the time of the update.    Information Source(s): Patient and CareEverywhere/SureScripts via in-person    Pertinent Information:     Changes made to PTA medication list:  Added: acetaminophen, albuterol inhaler, flonase, symbicort  Deleted: None  Changed: gabapentin, melatonin    Allergies reviewed with patient and updates made in EHR: yes    Medication History Completed By: Regina Negrete [facility] 5/16/2025 11:36 AM    PTA Med List   Medication Sig Note Last Dose/Taking    acetaminophen (TYLENOL) 500 MG tablet Take 500-1,000 mg by mouth daily.  5/16/2025 Morning    albuterol (PROAIR HFA/PROVENTIL HFA/VENTOLIN HFA) 108 (90 Base) MCG/ACT inhaler Inhale 1-2 puffs into the lungs every 4 hours as needed for shortness of breath.  Taking As Needed    fluticasone (FLONASE) 50 MCG/ACT nasal spray Spray 2 sprays into both nostrils daily as needed for allergies (or a cold).  Taking As Needed    gabapentin (NEURONTIN) 300 MG capsule Take 300 mg by mouth every evening. 5/16/2025: Takes a different dose in the morning 5/15/2025 Evening    gabapentin (NEURONTIN) 300 MG capsule Take 600 mg by mouth every morning. 5/16/2025: Takes a different dose in the evening 5/16/2025 Morning    lisinopril (ZESTRIL) 10 MG tablet Take 1 tablet (10 mg) by mouth daily.  5/16/2025 Morning    Melatonin 10 MG TABS tablet Take 10 mg by mouth at bedtime.  5/15/2025 Bedtime    SYMBICORT 80-4.5 MCG/ACT Inhaler Inhale 2 puffs into the lungs daily.  5/16/2025 Morning    Vitamin D3 (VITAMIN D, CHOLECALCIFEROL,) 25 mcg (1000 units) tablet Take 1 tablet by mouth daily (with lunch).  5/15/2025 Noon

## 2025-05-16 NOTE — LETTER
[place] [address]  [address] [address]  Phone: [phone]  Fax: [phone]    May 19, 2025        Gerri Stallings  [address] [place] [address]          To whom it may concern:    RE: Gerri Stallings    Please excuse Gerri Stallings from work related duties from 5/16/25 to 5/23/25 due to medical condition that required hospitalization and will require recovery.      Please contact me for questions or concerns.      Sincerely,      Evelio Graham, DO

## 2025-05-16 NOTE — PROGRESS NOTES
SPEECH PATHOLOGY CLINICAL SWALLOW EVALUATION  SPEECH PATHOLOGY SPEECH/LANGUAGE EVALUATION     05/16/25 2120   Appointment Info   Signing Clinician's Name / Credentials (SLP) Armando Apodaca MA [facility] SLP   General Information   Onset of Illness/Injury or Date of Surgery 05/16/25   Referring Physician Bernardo Carballo MD   Patient/Family Therapy Goal Statement (SLP) to eat/drink   Pertinent History of Current Problem 70 year old female with R sided weakness and slurred speech with fluctuating symptoms. Symptoms resolve then recur.  CT head and CTA unremarkable. Pt was eventually given TNK due to disabling deficits. Given fluctuating nature of symptom suspect lacunar stroke.   General Observations Currently asymptomatic, alert, close friend is present.   Type of Evaluation   Type of Evaluation Swallow Evaluation;Speech, Language, Cognition   Oral Motor   Oral Musculature generally intact   Mucosal Quality adequate   Dentition (Oral Motor)   Dentition (Oral Motor) dental appliance/dentures   Dental Appliance/Denture (Oral Motor) upper and lower   Comment, Dentition (Oral Motor) does not have lower in at this time   Facial Symmetry (Oral Motor)   Facial Symmetry (Oral Motor) WNL   Lip Function (Oral Motor)   Lip Range of Motion (Oral Motor) WNL   Lip Strength (Oral Motor) WNL   Tongue Function (Oral Motor)   Tongue ROM (Oral Motor) WNL   Tongue Strength (Oral Motor) WFL   Tongue Coordination/Speed (Oral Motor) WNL   Jaw Function (Oral Motor)   Jaw Function (Oral Motor) WNL   Cough/Swallow/Gag Reflex (Oral Motor)   Volitional Throat Clear/Cough (Oral Motor) WNL   Volitional Swallow (Oral Motor) WNL   Vocal Quality/Secretion Management (Oral Motor)   Vocal Quality (Oral Motor) WFL   Secretion Management (Oral Motor) WNL   General Swallowing Observations   Current Diet/Method of Nutritional Intake (General Swallowing Observations, NIS) NPO   Respiratory Support room air   Past History of Dysphagia none   Comment, General  "Swallowing Observations per patient and close friend report, she has baseline cough when drinking, usually when she takes large sips or drinks fast.   Swallowing Evaluation Clinical swallow evaluation   Clinical Swallow Evaluation   Feeding Assistance no assistance needed   Clinical Swallow Evaluation Textures Trialed thin liquids;soft & bite-sized;solid foods   Clinical Swallow Eval: Thin Liquid Texture Trial   Mode of Presentation, Thin Liquids cup;straw   Volume of Liquid or Food Presented 4 oz   Oral Phase of Swallow WFL   Pharyngeal Phase of Swallow coughing/choking   Diagnostic Statement Immediate cough by cup and straw when taking her \"normal\" sip which is quite large or consecutive sips. When cued to take small sips (like hot coffee) she has not cough, throat clear, or change in vocal quality.   Clinical Swallow Eval: Soft & Bite Sized   Mode of Presentation spoon;self-fed   Volume Presented 2 oz   Oral Phase WFL   Pharyngeal Phase intact   Diagnostic Statement slightly prolonged mastication due to lack of lower dentures, functional.   Clinical Swallow Evaluation: Solid Food Texture Trial   Mode of Presentation self-fed   Volume Presented saltine cracker   Oral Phase WFL   Pharyngeal Phase intact   Diagnostic Statement slightly prolonged mastication due to lack of lower dentures, functional.   Esophageal Phase of Swallow   Patient reports or presents with symptoms of esophageal dysphagia No   Swallowing Recommendations   Diet Consistency Recommendations thin liquids (level 0);regular diet   Mode of Delivery Recommendations bolus size, small;slow rate of intake   Recommended Feeding/Eating Techniques (Swallow Eval) maintain upright sitting position for eating   Comment, Swallowing Recommendations At baseline she coughs with liquids when she takes large swallows. No overt s/s aspiration when asked to take small sips, bites of solid. Recommend regular diet w/thin liquids when cleared for PO. No dysarthria or " "aphasia at the time of assessment.   Motor Speech   Speech Intelligibility (Motor Speech) WFL   Respiration (motor speech) None   Phonation (motor speech) Adequate   Rate/Prosody (Motor Speech) WFL   Articulation (Motor Speech) WFL   Vocal Loudness (Motor Speech) WFL   Breath Support (Motor Speech) intact   Comment, Motor Speech Assessment no dysarthria   Auditory Comprehension   Follows Commands (Auditory Comprehension) WFL;1-step command   1 Step, Follows Commands (Auditory Comprehension) intact;over 90% accuracy   Yes/No Questions (Auditory Comprehension) WFL   Paragraph Comprehension (Auditory Comprehension) intact   Comment, Assessment (Auditory Comprehension) WFL   Verbal Expression   Confrontational Naming (Verbal Expression) WFL;objects   Word Finding Skills (Verbal Expression) WFL;generative naming   Generative Naming, Word Finding (Verbal Expression) intact   Narrative Speech (Verbal Expression) WFL   Conversational Speech (Verbal Expression) WFL   Comment, Assessment (Verbal Expression) WFL   Pragmatic Language   Nonverbal Skills (Pragmatic Language) WFL   Verbal Skills (Pragmatic Language) WFL   Cognition   Cognitive Status oriented x4   Cognitive Status Exam Comments WFL   Clinical Impression   Criteria for Skilled Therapeutic Interventions Met (SLP Eval) Yes, treatment indicated   SLP Diagnosis Dysphagia   Risks & Benefits of therapy have been explained evaluation/treatment results reviewed;care plan/treatment goals reviewed;participants included;participants voiced agreement with care plan;patient   Clinical Impression Comments Patient says she currently has no symptoms. She has been experiencing brief episodes of slurred speech, right side weakness. Received TNK, but had another episode. May have further workup. At baseline she coughs when drinking liquids when taking large or consecutive boluses. Her \"normal\" sip is quite large by cup and straw, resulting in immediate coughing consistently. When " instructed to take a much smaller sip-like hot coffee, she has no overt s/s aspiration. She wears upper/lower dentures but lowers are not in right now. Mastication of soft and regular solids is prolonged but functional, no pocketing, no stasis. Swallows observed without s/s aspiration. Speech is intelligible without dysarthria or dysfluencies. Word finding is intact. Cognitive linguistic function appears intact. SLP to follow up when cleared for PO to ensure tolerance and small sips, address any new speech/lang issues that may occur.   SLP Total Evaluation Time   Eval: oral/pharyngeal swallow function, clinical swallow Minutes (73237) 15   Eval: Sound production with lang comprehension and expression Minutes (69381) 10   SLP Goals   Therapy Frequency (SLP Eval) 4 times/week   SLP Predicted Duration/Target Date for Goal Attainment 05/23/25   SLP Goals Swallow   SLP: Safely tolerate diet without signs/symptoms of aspiration Regular diet;Thin liquids;With use of compensatory swallow strategies  (small sips)   SLP Discharge Planning   SLP Plan Sat 0/4 transient stroke symp, check staus, f/u when diet ordered rec reg/thin, small sips. reassess if dysarthria or dysphagia recur.   SLP Rationale for DC Rec do not anticipate ongoing SLP at discharge   SLP Brief overview of current status  At baseline she coughs with liquids when she takes large swallows. No overt s/s aspiration when asked to take small sips, bites of solid. Recommend regular diet w/thin liquids when cleared for PO. No dysarthria or aphasia at the time of assessment.   SLP Time and Intention   Total Session Time (sum of timed and untimed services) 25

## 2025-05-16 NOTE — PROGRESS NOTES
Bedside EEG was performed that included photic, auditory, and sensory stimulation. Hyperventilation was not possible given the patient's clinical state.   Skin intact.  EEG #     [id number] EEG system used.    Neurology dictation to follow up.

## 2025-05-16 NOTE — CONSULTS
[facility]  [place]    Gerri Stallings MRN# [id number]   Age: 70 year old YOB: 1955               Assessment and Plan:      Recurrent left hemisphere deficits  Stroke versus seizure     As noted, she did get IV tenecteplase for the possibility of stroke.  CTA showed only some mild stenoses in the carotids and MCAs.  MRI is pending  Will keep the head of the bed more flat for now in case perfusion is part of the issue though CTA is not too compelling for that.    Exam now is fine    Will check EEG now as well to look for evidence of seizure.        ADDENDUM: EEG is normal. MRI is now done and shows a couple small infarcts subcortically on the left, so stroke is our diagnosis.  She had TNK appropriately.  Would start ASA and Plavix at 24 hours after TNK (this should be about 10:30 AM tomorrow).  Keep head of bed flat until then given her fluctuations earlier.  No further treatment to recommend for today.              Chief Complaint/HPI:     This patient is a 70-year-old woman admitted today with concern for stroke.  She did receive IV tenecteplase around 10:30 AM.  She continues to have episodes of aphasia, right-sided weakness, slurred speech.  I was called to see the patient urgently.  She has just had another episode which resolved just a few minutes ago, but had lasted longer than the other episodes.            Past Medical History:    has no past medical history on file.          Past Surgical History:    has a past surgical history that includes Bunionectomy (Bilateral); Cholecystectomy; Release carpal tunnel (Bilateral); back surgery; and Vagotomy.          Social History:     Social History     Tobacco Use    Smoking status: Every Day     Current packs/day: 0.25     Average packs/day: 0.9 packs/day for 51.4 years (46.6 ttl pk-yrs)     Types: Cigarettes     Start date: 1974    Smokeless tobacco: Never    Tobacco comments:     cutting back 1/25/2016   Substance Use Topics     Alcohol use: Yes     Alcohol/week: 4.0 standard drinks of alcohol             Family History:     Family History   Problem Relation Age of Onset    Cerebrovascular Disease Mother     Hypertension Mother     Diverticulitis Mother     Glaucoma Mother     Obesity Mother     Pancreatic Cancer Father         metastatic    Hypertension Sister     Obesity Sister     Diabetes Brother     Obesity Brother                 Allergies:   No Known Allergies          Medications:     Current Facility-Administered Medications:     albuterol (PROVENTIL HFA/VENTOLIN HFA) inhaler, 1-2 puff, Inhalation, Q4H PRN, Bernardo Carballo MD    [START ON 5/17/2025] fluticasone-vilanterol (BREO ELLIPTA) 100-25 MCG/ACT inhaler 1 puff, 1 puff, Inhalation, Daily, Bernardo Carballo MD    [Held by provider] gabapentin (NEURONTIN) capsule 300 mg, 300 mg, Oral, QPM, Bernardo Carballo MD    [Held by provider] gabapentin (NEURONTIN) capsule 600 mg, 600 mg, Oral, QAM, Bernardo Carballo MD    hydrALAZINE (APRESOLINE) injection 10 mg, 10 mg, Intravenous, Q30 Min PRN, Tess Reddy MD    labetalol (NORMODYNE/TRANDATE) injection 10 mg, 10 mg, Intravenous, Q10 Min PRN, Tess Reddy MD    lidocaine (LMX4) cream, , Topical, Q1H PRN, Bernardo Carballo MD    lidocaine 1 % 0.1-1 mL, 0.1-1 mL, Other, Q1H PRN, Bernardo Carballo MD    [Held by provider] lisinopril (ZESTRIL) tablet 10 mg, 10 mg, Oral, Daily, Bernardo Carballo MD    Medication Instruction - Avoid dextrose in IV solutions, , Intravenous, Continuous PRN, Bernardo Carballo MD    niCARdipine 40 mg in 200 mL NS (CARDENE) infusion, 2.5-15 mg/hr, Intravenous, Continuous PRN, Tses Reddy MD    ondansetron (ZOFRAN ODT) ODT tab 4 mg, 4 mg, Oral, Q6H PRN **OR** ondansetron (ZOFRAN) injection 4 mg, 4 mg, Intravenous, Q6H PRN, Bernardo Carballo MD    sodium chloride (PF) 0.9% PF flush 3 mL, 3 mL, Intracatheter, Q8H COLTON, Bernardo Carballo MD    sodium chloride (PF) 0.9% PF flush 3 mL, 3 mL, Intracatheter,  q1 min prn, Bernardo Carballo MD    sodium chloride 0.9 % infusion, , Intravenous, Continuous PRN, Tess Reddy MD              Physical Exam:      Vitals: BP (!) 140/75   Pulse 62   Temp 97.7  F (36.5  C) (Oral)   Resp 25   Wt 71.2 kg (157 lb)   LMP  (LMP Unknown)   SpO2 100%   BMI 26.98 kg/m    BMI= Body mass index is 26.98 kg/m .     Patient is alert and in no acute distress. Neck was supple, no carotid bruits, thyromegaly, lymphadenopathy or JVD noted.   Neurological Exam:   Mental status: Patient is alert and oriented x 3. Speech is clear and fluent    CN II: Visual fields are full to confrontation.  PERRLA.   CN III, IV, VI: EOMI.    CN VII: Face is symmetric with normal eye closure and smile.   CN VII: Hearing is normal to conversation   CN IX, X: Palate elevates symmetrically. Phonation is normal.    CN XI: Head turning and shoulder shrug are intact   CN XII: Tongue is midline with normal movements and no atrophy or fasciculations.   Motor: Muscle bulk and tone are normal. No pronator drift. Strength is 5/5 bilaterally. No fasciculations noted.   Reflexes: Reflexes are symmetric, diminished throughout. Plantar responses are flexor.   Sensory: Light touch, pinprick, and vibration sense are intact bilaterally.    Coordination: Rapid alternating movements and fine finger movements are intact. There is no dysmetria on finger-to-nose testing.  Still      Francisco J Rice MD       More than 60 minutes spent reviewing records and results, evaluating patient, discussing with pt, RN and family and on documentation

## 2025-05-16 NOTE — H&P
[facility]    History and Physical - Hospitalist Service       Date of Admission:  5/16/2025    Gerri Stallings is a 70 year old female who presents to the ED by EMS for evaluation of slurred speech and right-sided weakness earlier this morning    Assessment & Plan   Suspected acute CVA,  Presents with slurred speech, right-sided weakness  Initial fluctuating symptoms so no TNK given however developed worsening hemiparesis in ED  Subsequent TNK ordered by vascular neurology  Patient now admitted to ICU for further care  Head CT chronic lacunar infarct  Head/neck CTA mild narrowing M1/PCA, also narrowing/plaque left common carotid -please see formal report  ICU stroke order set placed, formal neurology consult placed, echo ordered for tomorrow  Brain MRI pending, statin ordered    Essential hypertension  Started lisinopril earlier this year  BP meds per stroke order set    Chronic low back pain  Not currently symptomatic  Tylenol if needed    History of smoking  Counseling done at bedside  Hold off on nicotine patch for now to complete CVA workup    Posterior capsule opacification  Post removal April of this year    Full code per discussion with patient at bedside         Diet: NPO for Medical/Clinical Reasons Except for: No Exceptions    DVT Prophylaxis: Low Risk/Ambulatory with no VTE prophylaxis indicated  Goldberg Catheter: Not present  Lines: None     Cardiac Monitoring: None  Code Status:  Above    Clinically Significant Risk Factors Present on Admission                   # Hypertension: Noted on problem list                      Disposition Plan     Medically Ready for Discharge: Anticipated in 2-4 Days           Bernardo Carballo MD  Hospitalist Service  [facility]  Securely message with [employer] (more info)  Text page via [employer] Paging/Directory     ______________________________________________________________________    Chief Complaint   Slurred speech with  right-sided weakness today    History is obtained from the patient    History of Present Illness   Per EMS, patient reports intermittent slurred speech, right arm weakness, and right facial numbness. Was last known to be at her baseline at 8 AM today. Not on any blood thinners. Patient was brought to the ED for further evaluation.      Denies any chest pain or SOB, no palpitations or headaches, no seizure disorder, no  or GI symptoms, nausea, vomiting, or diarrhea. Patient is otherwise in her normal state of health with no other concerns.    In ED vital signs were /67, respiration 18, heart rate 64, temperature was normal.Slurred speech and fluctuating motor symptoms.  Head CT no acute CVA.  Seen by telemetry stroke neurology who initially held TNK given fluctuating symptoms.  However found to have worsening deficits in ED and subsequently TNK was given.  She is admitted to the ICU currently.      Past Medical History    No past medical history on file.    Past Surgical History   Past Surgical History:   Procedure Laterality Date    BACK SURGERY      BUNIONECTOMY Bilateral     CHOLECYSTECTOMY      RELEASE CARPAL TUNNEL Bilateral     VAGOTOMY         Prior to Admission Medications   Prior to Admission Medications   Prescriptions Last Dose Informant Patient Reported? Taking?   Melatonin 10 MG TABS tablet 5/15/2025 Bedtime  Yes Yes   Sig: Take 10 mg by mouth at bedtime.   SYMBICORT 80-4.5 MCG/ACT Inhaler 5/16/2025 Morning  Yes Yes   Sig: Inhale 2 puffs into the lungs daily.   Vitamin D3 (VITAMIN D, CHOLECALCIFEROL,) 25 mcg (1000 units) tablet 5/15/2025 Noon  Yes Yes   Sig: Take 1 tablet by mouth daily (with lunch).   acetaminophen (TYLENOL) 500 MG tablet 5/16/2025 Morning  Yes Yes   Sig: Take 500-1,000 mg by mouth daily.   albuterol (PROAIR HFA/PROVENTIL HFA/VENTOLIN HFA) 108 (90 Base) MCG/ACT inhaler   Yes Yes   Sig: Inhale 1-2 puffs into the lungs every 4 hours as needed for shortness of breath.   fluticasone  (FLONASE) 50 MCG/ACT nasal spray   Yes Yes   Sig: Spray 2 sprays into both nostrils daily as needed for allergies (or a cold).   gabapentin (NEURONTIN) 300 MG capsule 5/16/2025 Morning  Yes Yes   Sig: Take 600 mg by mouth every morning.   gabapentin (NEURONTIN) 300 MG capsule 5/15/2025 Evening  Yes Yes   Sig: Take 300 mg by mouth every evening.   lisinopril (ZESTRIL) 10 MG tablet 5/16/2025 Morning  No Yes   Sig: Take 1 tablet (10 mg) by mouth daily.      Facility-Administered Medications: None           Physical Exam   Vital Signs: Temp: 97.5  F (36.4  C) Temp src: Oral BP: 139/65 Pulse: 68   Resp: 18 SpO2: 98 %      Weight: 0 lbs 0 oz  General Appearance:  AAO x 3  Respiratory: Clear anteriorly  Cardiovascular: S1-S2 only  GI: Soft and nontender BS heard  Skin: No erythema  Other:  No edema lower extremity  Neurology exam, normal speech, no facial asymmetry, bilateral extremities remain normal      Medical Decision Making       7 5 MINUTES SPENT BY ME on the date of service doing chart review, history, exam, documentation & further activities per the note.      Data   Imaging results reviewed over the past 24 hrs:   Recent Results (from the past 24 hours)   CTA Head Neck with Contrast    Narrative    EXAM: CTA HEAD NECK W CONTRAST  LOCATION: [facility]  DATE: 5/16/2025    INDICATION: Left-sided weakness.    COMPARISON: None.    CONTRAST: 67 mL Isovue 370.    TECHNIQUE: Head and neck CT angiogram with intravenous contrast. Noncontrast head CT followed by axial helical CT images of the head and neck vessels obtained during the arterial phase of intravenous contrast administration. Axial 2D reconstructed images   and multiplanar 3D MIP reconstructed images of the head and neck vessels were performed by the technologist. Dose reduction techniques were used. All stenosis measurements made according to NASCET criteria unless otherwise specified.    FINDINGS:   NONCONTRAST HEAD CT:   INTRACRANIAL  CONTENTS: No intracranial hemorrhage, extra-axial collection, or mass effect. No CT evidence of acute infarct. Prominent perivascular space versus chronic lacunar infarct in the inferior basal ganglia bilaterally, larger on the left.   Presumed chronic lacunar infarct in the right corona radiata extending to the posterior right lentiform nucleus. Mild burden scattered chronic small vessel ischemic change. Normal ventricles and sulci. Dural-based calcification along the superior right   frontal convexity.    VISUALIZED ORBITS/SINUSES/MASTOIDS: Prior bilateral cataract surgery. Visualized portions of the orbits are otherwise unremarkable. No paranasal sinus mucosal disease. No middle ear or mastoid effusion.    BONES/SOFT TISSUES: No acute abnormality.    HEAD CTA:  ANTERIOR CIRCULATION: Mild narrowing of the M1 segments bilaterally. Bilateral IAIN are patent. No aneurysm and no high flow vascular malformation. Standard [place] anatomy.    POSTERIOR CIRCULATION: Mild narrowing in the bilateral PCA with patent distal vertebral arteries and basilar artery. No aneurysm and no high flow vascular malformation. Dominant right and smaller left vertebral artery contribute to a normal basilar   artery.     DURAL VENOUS SINUSES: Not well evaluated on a technical basis.    NECK CTA:  RIGHT CAROTID: Mild narrowing at the right carotid bifurcation and proximal right internal carotid artery.    LEFT CAROTID: Mild narrowing at the left common carotid artery and left carotid bifurcation with a small ulcerative plaque.    VERTEBRAL ARTERIES: No focal stenosis or dissection. Dominant right and smaller left vertebral arteries.    AORTIC ARCH: Classic aortic arch anatomy with no significant stenosis at the origin of the great vessels.    NONVASCULAR STRUCTURES: Mild paraseptal emphysematous change at the visualized bilateral lung apices. Osteopenia with cervical spine degenerative change.      Impression    IMPRESSION:    NONCONTRAST HEAD CT:  1.  No acute intracranial abnormality.  2.  Chronic lacunar infarct in the right corona radiata extending to the posterior right lentiform nucleus.  3.  Prominent perivascular space versus chronic lacunar infarct in the inferior basal ganglia bilaterally.    HEAD CTA:  1.  Mild narrowing of the bilateral M1 segments and bilateral PCA.  2.  No aneurysm and no high flow vascular malformation.    NECK CTA:  1.  Mild narrowing at the left common carotid artery and left carotid bifurcation with a small ulcerative plaque at the left carotid bifurcation.  2.  Mild narrowing at the right carotid bifurcation and proximal right internal carotid artery.  3.  No high grade vertebral artery stenosis.    Head CT discussed with Dr. Hernan Holland by phone at 0939 hours on 5/16/2025. Head and neck CTA discussed with Dr. Hernan Holland by phone at 1004 hours on 5/16/2025.

## 2025-05-16 NOTE — CONSULTS
[facility]     Stroke Code Note          History of Present Illness     Chief Complaint: Slurred Speech and Stroke Symptoms      Gerri Stallings is a 70 year old  female with past medical hx of smoking presents to the ED with R sided weakness and slurred speech. According to the grand daughter at bedside pt called her about an hour prior to arrival reporting that she has R sided weakness and had slurred speech. Grand daughter came in to check in on her and she went outside and her symptoms were better but then came back. Per Ed provider EMS reported fluctuating symptoms.    According to the patient she woke up this morning around 6 AM and was fine and noticed all of sudden that her vision in R eye got dark and R side of her body was heavy , this happened around 8 AM.     While in the ED pt was seen on tele initially and was noted to only have NIHSS of 1 for slurred speech. TNK was deferred initially as symptoms were minor and pt reported them to be non disabling. Pt and family agreed with the plan. Pt was still in TNK window and it was decided that if symptoms worse they should reach out again. TNK window will end at 1230.     Later at 1015 I got a page from ED about pts worsening status.  On tele examination again pt was noted to have more significant dysarthria still nIHSS was 1. As we were discussing pt speech improved and then worsened again. This time slurred speech was noted with R sided facial droop and R arm weakness. NIHSS increased to 4. After further discussion with family pt was deemed candidate for TNK. TNK was ordered         Past Medical History     Stroke risk factors: Smoker    Preadmission antithrombotic regimen: None    Modified Miladis Score (Pre-morbid)  0-No deficits                   Assessment and Plan       1.  R sided weakness  2. Slurred speech     70 year old female with R sided weakness and slurred speech with fluctuating symptoms. CT head and CTA  "unremarkable. Pt was eventually given TNK due to disabling deficits. Given fluctuating nature of symptom suspect lacunar stroke.    Risks and benefits of use of IV thrombolysis were discussed with patient. I informed pt and family that this clot busting medication is associated with risk of bleeding which can be anywhere in the body including for GI tract, in urine or around heart. These certainly can be life threatening complications and occur in less than 1 % of patients. I further told them that the most concerned risk associated with this medication is intracranial bleeding which may require surgical intervention that occurs in about 2-3%. I informed them the benefit of medication is assessed as functional independence at 3 months and about 1 in 3 (33%) patient are more likely to be functional independent. Based on above mentioned numbers this treatment is approved and recommended by AHA for acute stroke treatment.  I also informed pt and family that in some stroke fluctuating symptoms can be seen. It is not guaranteed that treatment with IVT may or may not result in resolution of these fluctuations.       Intravenous Thrombolysis  Risks (including potential for bleeding and death), benefits, and alternatives to thrombolytic therapy were discussed with Patient and Family. Fluctuating exam between non disabling minor symptom of NIHSS 1 to disabling NIHSS of 4.     Endovascular Treatment  Not initiated due to absence of proximal vessel occlusion     Plan:  - Use orderset: \"Ischemic Stroke Post-Thrombolytics/Thrombectomy ICU Admission\"  - Neurochecks and vital signs per post-thrombolytic orders and monitor closely for any evidence of CNS hemorrhage, bleeding, or orolingual angioedema  - Goal  / 105  - Hold all antithrombotic and anticoagulant medications for 24 hrs post-thrombolytic  - Hold pharmacologic VTE prophylaxis for 24 hrs post-thrombolytic  - Statin:  Lipitor 40 mg  - Repeat Head CT 24 hrs " "post-thrombolytic  - MRI Brain with and without contrast   - Telemetry, EKG  - Bedside Glucose Monitoring  - A1c, Lipid Panel, Troponin x 3  - PT/OT/SLP  - Stroke Education  - Euthermia, Euglycemia     ___________________________________________________________________    Tess Reddy MD  Vascular Neurology    To page me or covering stroke neurology team member, click here: [employer]  Choose \"On Call\" tab at top, then select \"NEUROLOGY/ALL SITES\" from middle drop-down box, press Enter, then look for \"stroke\" or \"telestroke\" for your site.  ___________________________________________________________________        Imaging/Labs   (personally reviewed CT and CTA)    CT head: no acute abnormalities, chronic L basal ganglia hypodensity  CTA head/neck: No lvboor significant stenosis  CT Perfusion head: NA         Physical Examination     BP: (!) 140/75   Pulse: 62   Resp: 25   Temp: 97.7  F (36.5  C)   Temp src: Oral   SpO2: 100 %       Weight: 71.2 kg (157 lb)    Wt Readings from Last 2 Encounters:   05/16/25 71.2 kg (157 lb)   01/30/25 71.3 kg (157 lb 4 oz)       Neuro Exam  Mental Status:  alert, oriented x 3, follows commands, naming and repetition normal, slurred speech  Cranial Nerves:  visual fields intact (tested by nurse), EOMI with normal smooth pursuit, facial sensation intact and symmetric (tested by nurse), facial movements symmetric, hearing not formally tested but intact to conversation, no dysarthria, shoulder shrug equal bilaterally, tongue protrusion midline R sided facial droop  Motor:  R arm drift  Reflexes:  unable to test (telestroke)  Sensory:  light touch sensation intact and symmetric throughout upper and lower extremities (assessed by nurse), no extinction on double simultaneous stimulation (assessed by nurse)  Coordination:  normal finger-to-nose and heel-to-shin bilaterally without dysmetria, rapid alternating movements symmetric  Station/Gait:  unable to test due to telestroke        Stroke " Scales       NIHSS  1a. Level of Consciousness 0-->Alert, keenly responsive   1b. LOC Questions 0-->Answers both questions correctly   1c. LOC Commands 0-->Performs both tasks correctly   2.   Best Gaze 0-->Normal   3.   Visual 0-->No visual loss   4.   Facial Palsy 1-->Minor paralysis (flattened nasolabial fold, asymmetry on smiling)   5a. Motor Arm, Left 0-->No drift, limb holds 90 (or 45) degrees for full 10 secs   5b. Motor Arm, Right 1-->Drift, limb holds 90 (or 45) degrees, but drifts down before full 10 secs, does not hit bed or other support   6a. Motor Leg, Left 0-->No drift, leg holds 30 degree position for full 5 secs   6b. Motor Leg, right 0-->No drift, leg holds 30 degree position for full 5 secs   7.   Limb Ataxia 0-->Absent   8.   Sensory 0-->Normal, no sensory loss   9.   Best Language 0-->No aphasia, normal   10. Dysarthria 2-->Severe dysarthria, patients speech is so slurred as to be unintelligible in the absence of or out of proportion to any dysphasia, or is mute/anarthric   11. Extinction and Inattention  0-->No abnormality   Total 4 (05/16/25 1032)            Labs     CBC  Lab Results   Component Value Date    HGB 15.5 05/16/2025    HCT 46.8 05/16/2025    WBC 10.6 05/16/2025     05/16/2025       BMP  Lab Results   Component Value Date     05/16/2025    POTASSIUM 4.1 05/16/2025    CHLORIDE 102 05/16/2025    CO2 26 05/16/2025    BUN 10.8 05/16/2025    CR 0.84 05/16/2025    GLC 80 05/16/2025    EDISON 9.2 05/16/2025       INR  INR   Date Value Ref Range Status   05/16/2025 0.95 0.85 - 1.15 Final       Data   Stroke Code Data  (for stroke code with tele)  Stroke code activated 05/16/25  0930   First stroke provider response 05/16/25  0933   Video start time 05/16/25  0940   Video end time 05/16/25  1007   Last known normal 05/16/25  0800   Time of discovery (or onset of symptoms)  05/16/25  0800   Head CT read by Stroke Neuro Provider 05/16/25  1645   Was stroke code de-escalated? No            Telestroke Service Details  Type of service telemedicine diagnostic assessment of acute neurological changes   Reason telemedicine is appropriate patient requires assessment with a specialist for diagnosis and treatment of neurological symptoms   Mode of transmission secure interactive audio and video communication per Jossue   Originating site (patient location) [facility]    Distant site (provider location) [facility]        Clinically Significant Risk Factors Present on Admission                   # Hypertension: Noted on problem list                        Time Spent on this Encounter   Billing: I personally examined and evaluated the patient today. At the time of my evaluation and management the patient was critical condition today due to R sided weakness due to ischemic stroke s/p IVT. I spent a total of 75 minutes providing critical care services, evaluating the patient, directing care and reviewing laboratory values and radiologic reports.

## 2025-05-16 NOTE — ED TRIAGE NOTES
Patient brought by medic coming from home c/o  slurred speech this morning at around 0800 with right side weakness and numbness that is coming and going.   Blood sugar 95 en route. Tier 1 stroke called was called by MD.

## 2025-05-16 NOTE — ED PROVIDER NOTES
EMERGENCY DEPARTMENT ENCOUNTER      NAME: Gerri Stallings  AGE: 70 year old female  YOB: 1955  MRN: [id number]  EVALUATION DATE & TIME: No admission date for patient encounter.    PCP: Eli Smith    ED PROVIDER: Hernan Holland D.O.      Chief Complaint   Patient presents with    Slurred Speech    Stroke Symptoms       FINAL IMPRESSION:  1. Acute CVA (cerebrovascular accident) (H)        ED COURSE & MEDICAL DECISION MAKIN:23 AM I met with the patient to gather history and to perform my initial exam. I discussed the plan for care while in the Emergency Department.  9:24 AM Tier 1 stroke code was called.   9:34 AM I spoke to Stroke Neurology.  10:17 AM I spoke to Stroke Neurology and informed them that the patient's symptoms have returned.   10:31 AM I spoke to Dr. Reddy from Stroke Neurology.   10:55 AM I spoke to the intensivist, Dr. Jefferson.  11:05 AM I spoke to the admitting hospitalist, Dr. Carballo.         Pertinent Labs & Imaging studies reviewed. (See chart for details)  70 year old female presents to the Emergency Department for evaluation of right-sided weakness with slurred speech.  Patient did have significant slurred speech in the emergency department but her right sided weakness seem to be waxing and waning.  She arrived within the appropriate window for TNK, therefore was sent as a tier 1 stroke code to CT imaging.  CT imaging does not show any evidence of acute bleed, nor does it show evidence of obvious CVA.  There was no evidence of LVO on the CTA.  Stroke neurology initially evaluated the patient and all she had was some mild slurred speech, however shortly after their evaluation the patient began to have the right sided weakness again and we reconsulted stroke neurology to reevaluate the patient.  Upon the repeat evaluation, stroke neurology then decided that it was prudent to start TNK for concern for CVA due to her strokelike symptoms and right-sided weakness.  At  this time, plan is to admission for the ICU for concern for CVA.  Discussed with the intensivist and the hospitalist who both agreed to the admission.    Medical Decision Making  I obtained history from EMS  I reviewed the EMR: Outpatient Record: Visit on 4/21/2025 at Ophthalmology at [facility] for evaluation of posterior capsular opacification.  Care impacted by Hypertension  I discussed the care with another health care provider: Dr. Reddy Stroke Neurology and hospitalist, Dr. Carballo.  Admit.    MIPS (CTPE, Dental pain, Goldberg, Sinusitis, Asthma/COPD, Head Trauma): Not Applicable    SEPSIS: None          At the conclusion of the encounter I discussed the results of all of the tests and the disposition. The questions were answered. The patient or family acknowledged understanding and was agreeable with the care plan.      CRITICAL CARE:  Critical Care  Performed by: SILVESTRE LARSEN  Authorized by: SILVESTRE LARSEN  Total critical care time: 35 minutes  Critical care time was exclusive of separately billable procedures and treating other patients.  Critical care was necessary to treat or prevent imminent or life-threatening deterioration of the following conditions: CVA  Critical care was time spent personally by me on the following activities: development of treatment plan with patient or surrogate, discussions with consultants, examination of patient, evaluation of patient's response to treatment, obtaining history from patient or surrogate, ordering and performing treatments and interventions, ordering and review of laboratory studies, ordering and review of radiographic studies and re-evaluation of patient's condition, this excludes any separately billable procedures.        HPI    Patient information was obtained from: Patient and EMS    Use of : N/A        Gerri Stallings is a 70 year old female who presents to the ED by EMS for evaluation of slurred speech and stroke  symptoms.     Per EMS, patient reports intermittent slurred speech, right arm weakness, and right facial numbness. Was last known to be at her baseline at 8 AM today. Not on any blood thinners. Patient was brought to the ED for further evaluation.     Denies any fevers, nausea, vomiting, or diarrhea. Patient is otherwise in her normal state of health with no other concerns.     Per Chart Review: Patient was seen on 4/21/2025 at Ophthalmology at [facility] for evaluation of posterior capsular opacification. Yag capsulotomy left eye procedure performed without any complications. Patient was discharged home in stable condition.       PAST MEDICAL HISTORY:  No past medical history on file.    PAST SURGICAL HISTORY:  Past Surgical History:   Procedure Laterality Date    BACK SURGERY      BUNIONECTOMY Bilateral     CHOLECYSTECTOMY      RELEASE CARPAL TUNNEL Bilateral     VAGOTOMY           CURRENT MEDICATIONS:    Current Facility-Administered Medications   Medication Dose Route Frequency Provider Last Rate Last Admin    hydrALAZINE (APRESOLINE) injection 10 mg  10 mg Intravenous Q30 Min PRN Tess Reddy MD        labetalol (NORMODYNE/TRANDATE) injection 10 mg  10 mg Intravenous Q10 Min PRN Tess Reddy MD        niCARdipine 40 mg in 200 mL NS (CARDENE) infusion  2.5-15 mg/hr Intravenous Continuous PRN Tess Reddy MD        sodium chloride 0.9 % infusion   Intravenous Continuous PRN Tess Reddy MD         Current Outpatient Medications   Medication Sig Dispense Refill    gabapentin (NEURONTIN) 300 MG capsule Take 300 mg by mouth.      lisinopril (ZESTRIL) 10 MG tablet Take 1 tablet (10 mg) by mouth daily. 90 tablet 1    Melatonin 10 MG TABS tablet Take 10 mg by mouth nightly as needed for sleep.      Vitamin D3 (VITAMIN D, CHOLECALCIFEROL,) 25 mcg (1000 units) tablet Take by mouth daily.           ALLERGIES:  No Known Allergies    FAMILY HISTORY:  Family History   Problem Relation Age  of Onset    Cerebrovascular Disease Mother     Hypertension Mother     Diverticulitis Mother     Glaucoma Mother     Obesity Mother     Pancreatic Cancer Father         metastatic    Hypertension Sister     Obesity Sister     Diabetes Brother     Obesity Brother        SOCIAL HISTORY:  Social History     Socioeconomic History    Marital status:    Tobacco Use    Smoking status: Every Day     Current packs/day: 0.25     Average packs/day: 0.9 packs/day for 51.4 years (46.6 ttl pk-yrs)     Types: Cigarettes     Start date: 1974    Smokeless tobacco: Never    Tobacco comments:     cutting back 1/25/2016   Substance and Sexual Activity    Alcohol use: Yes     Alcohol/week: 4.0 standard drinks of alcohol    Drug use: No     Social Drivers of Health     Financial Resource Strain: Low Risk  (12/6/2023)    Financial Resource Strain     Within the past 12 months, have you or your family members you live with been unable to get utilities (heat, electricity) when it was really needed?: No   Food Insecurity: Low Risk  (12/6/2023)    Food Insecurity     Within the past 12 months, did you worry that your food would run out before you got money to buy more?: No     Within the past 12 months, did the food you bought just not last and you didn t have money to get more?: No   Transportation Needs: Low Risk  (12/6/2023)    Transportation Needs     Within the past 12 months, has lack of transportation kept you from medical appointments, getting your medicines, non-medical meetings or appointments, work, or from getting things that you need?: No   Interpersonal Safety: Unknown (5/2/2024)    Received from [facility]    Humiliation, Afraid, Rape, and Kick questionnaire     Physically Abused: No     Sexually Abused: No   Housing Stability: Low Risk  (12/6/2023)    Housing Stability     Do you have housing? : Yes     Are you worried about losing your housing?: No       VITALS:  Patient Vitals for the past 24 hrs:   BP Temp Temp  src Pulse Resp SpO2   05/16/25 1058 130/59 -- -- 86 30 99 %   05/16/25 1055 136/78 -- -- 72 20 94 %   05/16/25 1054 135/83 97.5  F (36.4  C) Oral 73 25 --   05/16/25 1040 95/60 -- -- 66 17 93 %   05/16/25 1039 (!) 143/67 -- -- 66 18 92 %   05/16/25 1038 -- -- -- 73 18 100 %   05/16/25 1037 -- -- -- 81 13 95 %   05/16/25 1036 -- -- -- 83 12 --   05/16/25 1035 (!) 143/67 -- -- 64 18 96 %   05/16/25 1034 -- -- -- 64 16 100 %   05/16/25 1033 -- -- -- 64 15 100 %   05/16/25 1032 -- -- -- 70 16 96 %   05/16/25 1031 (!) 164/74 -- -- 65 24 (!) 90 %   05/16/25 1030 (!) 145/67 -- -- 65 -- 98 %   05/16/25 1029 -- -- -- 65 -- 98 %   05/16/25 1028 -- -- -- 70 -- 100 %   05/16/25 1027 -- -- -- 69 -- 100 %   05/16/25 1026 -- -- -- 66 -- 98 %   05/16/25 1025 -- -- -- 65 -- 99 %   05/16/25 1024 -- -- -- 65 -- 100 %   05/16/25 1023 -- -- -- 66 -- 99 %   05/16/25 1022 -- -- -- 67 -- (!) 88 %   05/16/25 1021 -- -- -- 72 -- (!) 82 %   05/16/25 1020 -- -- -- 70 -- (!) 83 %   05/16/25 1019 -- -- -- 70 -- (!) 80 %   05/16/25 1018 -- -- -- -- -- (!) 89 %   05/16/25 1016 -- 97.7  F (36.5  C) Oral -- 16 --   05/16/25 1015 (!) 141/81 -- -- -- -- --   05/16/25 1013 (!) 145/67 -- -- -- -- --   05/16/25 1008 -- -- -- 71 -- 98 %   05/16/25 1007 -- -- -- 65 -- 98 %   05/16/25 1006 -- -- -- 68 -- 98 %   05/16/25 1005 (!) 188/82 -- -- -- -- (!) 87 %       PHYSICAL EXAM    VITAL SIGNS: /59   Pulse 86   Temp 97.5  F (36.4  C) (Oral)   Resp 30   LMP  (LMP Unknown)   SpO2 99%     General Appearance: Well-appearing, well-nourished, no acute distress   Head:  Normocephalic, without obvious abnormality, atraumatic  Eyes:  PERRL, conjunctiva/corneas clear, EOM's intact,  ENT:  Lips, mucosa, and tongue normal, membranes are moist without pallor  Neck:  Normal ROM, symmetrical, trachea midline    Cardio:  Regular rate and rhythm, no murmur, rub or gallop, 2+ pulses symmetric in all extremities  Pulm:  Clear to auscultation bilaterally, respirations  unlabored,  Abdomen:  Soft, non-tender, no rebound or guarding.  Musculoskeletal: Full ROM, no edema, no cyanosis, good ROM of major joints  Integument:  Warm, Dry, No erythema, No rash.        [employer] Stroke Scale  Exam Interval: Baseline   Score    Level of consciousness: (0)   Alert, keenly responsive    LOC questions: (0)   Answers both questions correctly    LOC commands: (0)   Performs both tasks correctly    Best gaze: (0)   Normal    Visual: (0)   No visual loss    Facial palsy: (2)   Partial paralysis (total/near total of lower face)    Motor arm (left): (0)   No drift    Motor arm (right): (2)   Some effort against gravity    Motor leg (left): (0)   No drift    Motor leg (right): (2)   Some effort against gravity    Limb ataxia: (0)   Absent    Sensory: (0)   Normal- no sensory loss    Best language: (0)   Normal- no aphasia    Dysarthria: (1)   Mild to moderate dysarthria    Extinction and inattention: (0)   No abnormality        Total Score:  7       LABS  Results for orders placed or performed during the hospital encounter of 05/16/25 (from the past 24 hours)   CBC with Platelets & Differential    Narrative    The following orders were created for panel order CBC with Platelets & Differential.  Procedure                               Abnormality         Status                     ---------                               -----------         ------                     CBC with platelets and ...[[id number]]                      Final result                 Please view results for these tests on the individual orders.   Basic metabolic panel   Result Value Ref Range    Sodium 138 135 - 145 mmol/L    Potassium 4.1 3.4 - 5.3 mmol/L    Chloride 102 98 - 107 mmol/L    Carbon Dioxide (CO2) 26 22 - 29 mmol/L    Anion Gap 10 7 - 15 mmol/L    Urea Nitrogen 10.8 8.0 - 23.0 mg/dL    Creatinine 0.84 0.51 - 0.95 mg/dL    GFR Estimate 74 >60 mL/min/1.73m2    Calcium 9.2 8.8 - 10.4 mg/dL    Glucose  86 70 - 99 mg/dL   INR   Result Value Ref Range    INR 0.95 0.85 - 1.15    PT 12.9 11.8 - 14.8 Seconds   Partial thromboplastin time   Result Value Ref Range    aPTT 24 22 - 38 Seconds   Troponin T, High Sensitivity   Result Value Ref Range    Troponin T, High Sensitivity <6 <=14 ng/L   CBC with platelets and differential   Result Value Ref Range    WBC Count 10.6 4.0 - 11.0 10e3/uL    RBC Count 5.05 3.80 - 5.20 10e6/uL    Hemoglobin 15.5 11.7 - 15.7 g/dL    Hematocrit 46.8 35.0 - 47.0 %    MCV 93 78 - 100 fL    MCH 30.7 26.5 - 33.0 pg    MCHC 33.1 31.5 - 36.5 g/dL    RDW 13.5 10.0 - 15.0 %    Platelet Count 407 150 - 450 10e3/uL    % Neutrophils 66 %    % Lymphocytes 26 %    % Monocytes 6 %    % Eosinophils 1 %    % Basophils 1 %    % Immature Granulocytes 0 %    NRBCs per 100 WBC 0 <1 /100    Absolute Neutrophils 7.0 1.6 - 8.3 10e3/uL    Absolute Lymphocytes 2.8 0.8 - 5.3 10e3/uL    Absolute Monocytes 0.6 0.0 - 1.3 10e3/uL    Absolute Eosinophils 0.1 0.0 - 0.7 10e3/uL    Absolute Basophils 0.1 0.0 - 0.2 10e3/uL    Absolute Immature Granulocytes 0.0 <=0.4 10e3/uL    Absolute NRBCs 0.0 10e3/uL   CTA Head Neck with Contrast    Narrative    EXAM: CTA HEAD NECK W CONTRAST  LOCATION: [facility]  DATE: 5/16/2025    INDICATION: Left-sided weakness.    COMPARISON: None.    CONTRAST: 67 mL Isovue 370.    TECHNIQUE: Head and neck CT angiogram with intravenous contrast. Noncontrast head CT followed by axial helical CT images of the head and neck vessels obtained during the arterial phase of intravenous contrast administration. Axial 2D reconstructed images   and multiplanar 3D MIP reconstructed images of the head and neck vessels were performed by the technologist. Dose reduction techniques were used. All stenosis measurements made according to NASCET criteria unless otherwise specified.    FINDINGS:   NONCONTRAST HEAD CT:   INTRACRANIAL CONTENTS: No intracranial hemorrhage, extra-axial collection, or  mass effect. No CT evidence of acute infarct. Prominent perivascular space versus chronic lacunar infarct in the inferior basal ganglia bilaterally, larger on the left.   Presumed chronic lacunar infarct in the right corona radiata extending to the posterior right lentiform nucleus. Mild burden scattered chronic small vessel ischemic change. Normal ventricles and sulci. Dural-based calcification along the superior right   frontal convexity.    VISUALIZED ORBITS/SINUSES/MASTOIDS: Prior bilateral cataract surgery. Visualized portions of the orbits are otherwise unremarkable. No paranasal sinus mucosal disease. No middle ear or mastoid effusion.    BONES/SOFT TISSUES: No acute abnormality.    HEAD CTA:  ANTERIOR CIRCULATION: Mild narrowing of the M1 segments bilaterally. Bilateral IAIN are patent. No aneurysm and no high flow vascular malformation. Standard [place] anatomy.    POSTERIOR CIRCULATION: Mild narrowing in the bilateral PCA with patent distal vertebral arteries and basilar artery. No aneurysm and no high flow vascular malformation. Dominant right and smaller left vertebral artery contribute to a normal basilar   artery.     DURAL VENOUS SINUSES: Not well evaluated on a technical basis.    NECK CTA:  RIGHT CAROTID: Mild narrowing at the right carotid bifurcation and proximal right internal carotid artery.    LEFT CAROTID: Mild narrowing at the left common carotid artery and left carotid bifurcation with a small ulcerative plaque.    VERTEBRAL ARTERIES: No focal stenosis or dissection. Dominant right and smaller left vertebral arteries.    AORTIC ARCH: Classic aortic arch anatomy with no significant stenosis at the origin of the great vessels.    NONVASCULAR STRUCTURES: Mild paraseptal emphysematous change at the visualized bilateral lung apices. Osteopenia with cervical spine degenerative change.      Impression    IMPRESSION:   NONCONTRAST HEAD CT:  1.  No acute intracranial abnormality.  2.  Chronic  lacunar infarct in the right corona radiata extending to the posterior right lentiform nucleus.  3.  Prominent perivascular space versus chronic lacunar infarct in the inferior basal ganglia bilaterally.    HEAD CTA:  1.  Mild narrowing of the bilateral M1 segments and bilateral PCA.  2.  No aneurysm and no high flow vascular malformation.    NECK CTA:  1.  Mild narrowing at the left common carotid artery and left carotid bifurcation with a small ulcerative plaque at the left carotid bifurcation.  2.  Mild narrowing at the right carotid bifurcation and proximal right internal carotid artery.  3.  No high grade vertebral artery stenosis.    Head CT discussed with Dr. Hernan Holland by phone at 0939 hours on 5/16/2025. Head and neck CTA discussed with Dr. Hernan Holland by phone at 1004 hours on 5/16/2025.     Glucose by meter   Result Value Ref Range    GLUCOSE BY METER POCT 80 70 - 99 mg/dL         RADIOLOGY  CTA Head Neck with Contrast   Final Result   IMPRESSION:    NONCONTRAST HEAD CT:   1.  No acute intracranial abnormality.   2.  Chronic lacunar infarct in the right corona radiata extending to the posterior right lentiform nucleus.   3.  Prominent perivascular space versus chronic lacunar infarct in the inferior basal ganglia bilaterally.      HEAD CTA:   1.  Mild narrowing of the bilateral M1 segments and bilateral PCA.   2.  No aneurysm and no high flow vascular malformation.      NECK CTA:   1.  Mild narrowing at the left common carotid artery and left carotid bifurcation with a small ulcerative plaque at the left carotid bifurcation.   2.  Mild narrowing at the right carotid bifurcation and proximal right internal carotid artery.   3.  No high grade vertebral artery stenosis.      Head CT discussed with Dr. Hernan Holland by phone at 0939 hours on 5/16/2025. Head and neck CTA discussed with Dr. Hernan Holland by phone at 1004 hours on 5/16/2025.              EKG:    Rate: 67 bpm  Rhythm: Normal Sinus  Rhythm  Axis: Normal  Interval: Normal  Conduction: Normal  QRS: Normal  ST: Normal  T-wave: Normal  QT: Not prolonged  Comparison EKG: no significant change compared to 25 May 2023  Impression:  No acute ischemic change   I have independently reviewed and interpreted today's EKG, pending Cardiologist read          MEDICATIONS GIVEN IN THE EMERGENCY:  Medications   sodium chloride 0.9 % infusion (has no administration in time range)   labetalol (NORMODYNE/TRANDATE) injection 10 mg (has no administration in time range)   hydrALAZINE (APRESOLINE) injection 10 mg (has no administration in time range)   niCARdipine 40 mg in 200 mL NS (CARDENE) infusion (has no administration in time range)   iopamidol (ISOVUE-370) solution 67 mL (67 mLs Intravenous $Given 5/16/25 0949)     And   sodium chloride 0.9 % bag for CT scan flush (100 mLs As instructed $Given 5/16/25 0969)   tenecteplase (TNKase) injection 18 mg (18 mg Intravenous $Given 5/16/25 1039)       NEW PRESCRIPTIONS STARTED AT TODAY'S ER VISIT  New Prescriptions    No medications on file        I, Carmela Davidson, am serving as a scribe to document services personally performed by Hernan Holland D.O., based on my observations and the provider's statements to me.  I, Hernan Holland D.O., attest that Carmela Davidson is acting in a scribe capacity, has observed my performance of the services and has documented them in accordance with my direction.     Hernan Holland D.O.  Emergency Medicine  [facility] EMERGENCY DEPARTMENT  [address] [place] [id number] [phone]  Dept: [phone]     Hernan Holland,   05/16/25 [id number]

## 2025-05-16 NOTE — ED NOTES
Bed: JNED-18  Expected date:   Expected time:   Means of arrival:   Comments:  [place]- 71yo F Stroke Code

## 2025-05-17 ENCOUNTER — APPOINTMENT (OUTPATIENT)
Dept: OCCUPATIONAL THERAPY | Facility: HOSPITAL | Age: 70
DRG: 062 | End: 2025-05-17
Attending: INTERNAL MEDICINE
Payer: COMMERCIAL

## 2025-05-17 ENCOUNTER — APPOINTMENT (OUTPATIENT)
Dept: PHYSICAL THERAPY | Facility: HOSPITAL | Age: 70
DRG: 062 | End: 2025-05-17
Attending: INTERNAL MEDICINE
Payer: COMMERCIAL

## 2025-05-17 ENCOUNTER — HEALTH MAINTENANCE LETTER (OUTPATIENT)
Age: 70
End: 2025-05-17

## 2025-05-17 ENCOUNTER — APPOINTMENT (OUTPATIENT)
Dept: SPEECH THERAPY | Facility: HOSPITAL | Age: 70
DRG: 062 | End: 2025-05-17
Payer: COMMERCIAL

## 2025-05-17 ENCOUNTER — APPOINTMENT (OUTPATIENT)
Dept: CT IMAGING | Facility: HOSPITAL | Age: 70
DRG: 062 | End: 2025-05-17
Attending: PSYCHIATRY & NEUROLOGY
Payer: COMMERCIAL

## 2025-05-17 LAB
ANION GAP SERPL CALCULATED.3IONS-SCNC: 10 MMOL/L (ref 7–15)
APTT PPP: 25 SECONDS (ref 22–38)
BUN SERPL-MCNC: 10.1 MG/DL (ref 8–23)
CALCIUM SERPL-MCNC: 9.1 MG/DL (ref 8.8–10.4)
CHLORIDE SERPL-SCNC: 110 MMOL/L (ref 98–107)
CREAT SERPL-MCNC: 0.68 MG/DL (ref 0.51–0.95)
EGFRCR SERPLBLD CKD-EPI 2021: >90 ML/MIN/1.73M2
ERYTHROCYTE [DISTWIDTH] IN BLOOD BY AUTOMATED COUNT: 13.2 % (ref 10–15)
GLUCOSE BLDC GLUCOMTR-MCNC: 90 MG/DL (ref 70–99)
GLUCOSE BLDC GLUCOMTR-MCNC: 96 MG/DL (ref 70–99)
GLUCOSE SERPL-MCNC: 90 MG/DL (ref 70–99)
HCO3 SERPL-SCNC: 21 MMOL/L (ref 22–29)
HCT VFR BLD AUTO: 43.5 % (ref 35–47)
HGB BLD-MCNC: 14.5 G/DL (ref 11.7–15.7)
INR PPP: 0.97 (ref 0.85–1.15)
MCH RBC QN AUTO: 30.1 PG (ref 26.5–33)
MCHC RBC AUTO-ENTMCNC: 33.3 G/DL (ref 31.5–36.5)
MCV RBC AUTO: 90 FL (ref 78–100)
PLATELET # BLD AUTO: 315 10E3/UL (ref 150–450)
POTASSIUM SERPL-SCNC: 3.9 MMOL/L (ref 3.4–5.3)
PROTHROMBIN TIME: 13.1 SECONDS (ref 11.8–14.8)
RBC # BLD AUTO: 4.82 10E6/UL (ref 3.8–5.2)
SODIUM SERPL-SCNC: 141 MMOL/L (ref 135–145)
WBC # BLD AUTO: 9.2 10E3/UL (ref 4–11)

## 2025-05-17 PROCEDURE — 36415 COLL VENOUS BLD VENIPUNCTURE: CPT | Performed by: INTERNAL MEDICINE

## 2025-05-17 PROCEDURE — 250N000013 HC RX MED GY IP 250 OP 250 PS 637: Performed by: STUDENT IN AN ORGANIZED HEALTH CARE EDUCATION/TRAINING PROGRAM

## 2025-05-17 PROCEDURE — 120N000004 HC R&B MS OVERFLOW

## 2025-05-17 PROCEDURE — 92526 ORAL FUNCTION THERAPY: CPT | Mod: GN

## 2025-05-17 PROCEDURE — 97165 OT EVAL LOW COMPLEX 30 MIN: CPT | Mod: GO

## 2025-05-17 PROCEDURE — 85730 THROMBOPLASTIN TIME PARTIAL: CPT | Performed by: INTERNAL MEDICINE

## 2025-05-17 PROCEDURE — 250N000013 HC RX MED GY IP 250 OP 250 PS 637: Performed by: INTERNAL MEDICINE

## 2025-05-17 PROCEDURE — 70450 CT HEAD/BRAIN W/O DYE: CPT

## 2025-05-17 PROCEDURE — 97535 SELF CARE MNGMENT TRAINING: CPT | Mod: GO

## 2025-05-17 PROCEDURE — 85018 HEMOGLOBIN: CPT | Performed by: INTERNAL MEDICINE

## 2025-05-17 PROCEDURE — 97161 PT EVAL LOW COMPLEX 20 MIN: CPT | Mod: GP

## 2025-05-17 PROCEDURE — 250N000011 HC RX IP 250 OP 636: Mod: JZ | Performed by: PSYCHIATRY & NEUROLOGY

## 2025-05-17 PROCEDURE — 80048 BASIC METABOLIC PNL TOTAL CA: CPT | Performed by: INTERNAL MEDICINE

## 2025-05-17 PROCEDURE — 99233 SBSQ HOSP IP/OBS HIGH 50: CPT | Performed by: PSYCHIATRY & NEUROLOGY

## 2025-05-17 PROCEDURE — 85610 PROTHROMBIN TIME: CPT | Performed by: INTERNAL MEDICINE

## 2025-05-17 RX ORDER — LORAZEPAM 2 MG/ML
2 INJECTION INTRAMUSCULAR
Status: DISCONTINUED | OUTPATIENT
Start: 2025-05-17 | End: 2025-05-17

## 2025-05-17 RX ORDER — ASPIRIN 81 MG/1
81 TABLET ORAL DAILY
Status: DISCONTINUED | OUTPATIENT
Start: 2025-05-17 | End: 2025-05-19 | Stop reason: HOSPADM

## 2025-05-17 RX ORDER — CLOPIDOGREL BISULFATE 75 MG/1
300 TABLET ORAL ONCE
Status: COMPLETED | OUTPATIENT
Start: 2025-05-17 | End: 2025-05-17

## 2025-05-17 RX ORDER — ACETAMINOPHEN 325 MG/1
325 TABLET ORAL EVERY 4 HOURS PRN
Status: DISCONTINUED | OUTPATIENT
Start: 2025-05-17 | End: 2025-05-19 | Stop reason: HOSPADM

## 2025-05-17 RX ORDER — CLOPIDOGREL BISULFATE 75 MG/1
75 TABLET ORAL DAILY
Status: DISCONTINUED | OUTPATIENT
Start: 2025-05-18 | End: 2025-05-19 | Stop reason: HOSPADM

## 2025-05-17 RX ADMIN — ACETAMINOPHEN 325 MG: 325 TABLET ORAL at 17:24

## 2025-05-17 RX ADMIN — HYDRALAZINE HYDROCHLORIDE 10 MG: 20 INJECTION INTRAMUSCULAR; INTRAVENOUS at 13:11

## 2025-05-17 RX ADMIN — ATORVASTATIN CALCIUM 40 MG: 40 TABLET, FILM COATED ORAL at 20:25

## 2025-05-17 RX ADMIN — CLOPIDOGREL 300 MG: 75 TABLET ORAL at 15:35

## 2025-05-17 RX ADMIN — FLUTICASONE FUROATE AND VILANTEROL TRIFENATATE 1 PUFF: 100; 25 POWDER RESPIRATORY (INHALATION) at 07:45

## 2025-05-17 RX ADMIN — ASPIRIN 81 MG: 81 TABLET, COATED ORAL at 15:35

## 2025-05-17 ASSESSMENT — ACTIVITIES OF DAILY LIVING (ADL)
ADLS_ACUITY_SCORE: 44
IADL_COMMENTS: IND ALL ADLS/IADLS
ADLS_ACUITY_SCORE: 44

## 2025-05-17 NOTE — PROGRESS NOTES
05/17/25 6845   Appointment Info   Signing Clinician's Name / Credentials (OT) Mary Reyes,OTR/L   Living Environment   People in Home alone   Current Living Arrangements other (see comments)  (cottage)   Home Accessibility no concerns   Transportation Anticipated car, drives self   Living Environment Comments Pt able to live on main level   Self-Care   Current Activity Tolerance moderate   Equipment Currently Used at Home none;other (see comments)  (tub w/ GB)   Fall history within last six months no   Instrumental Activities of Daily Living (IADL)   IADL Comments IND all ADLs/IADLs   General Information   Onset of Illness/Injury or Date of Surgery 05/16/25   Patient/Family Therapy Goal Statement (OT) none stated   Additional Occupational Profile Info/Pertinent History of Current Problem Gerri Stallings is a 70-year-old woman who presents with strokelike symptoms of slurred speech and right-sided deficits.  Have confirmed acute lacunar infarcts on MRI within the left internal capsule and left parietal white matter.   Existing Precautions/Restrictions fall   Limitations/Impairments safety/cognitive   Cognitive Status Examination   Orientation Status orientation to person, place and time  (speech slurred somewhat)   Cognitive Status Comments Recommend cognitive screening   Visual Perception   Visual Impairment/Limitations WFL   Range of Motion Comprehensive   General Range of Motion no range of motion deficits identified   Strength Comprehensive (MMT)   General Manual Muscle Testing (MMT) Assessment no strength deficits identified   Coordination   Upper Extremity Coordination No deficits were identified   Bed Mobility   Bed Mobility supine-sit;sit-supine   Supine-Sit [place] (Bed Mobility) supervision   Sit-Supine [place] (Bed Mobility) supervision   Transfers   Transfers sit-stand transfer;toilet transfer   Sit-Stand Transfer   Sit-Stand [place] (Transfers) contact guard   Toilet Transfer   Type  (Toilet Transfer) sit-stand;stand-sit   [place] Level (Toilet Transfer) contact guard   Balance   Balance Assessment standing dynamic balance   Balance Comments decreased balance, needing CGA   Activities of Daily Living   BADL Assessment/Intervention grooming;toileting;lower body dressing   Lower Body Dressing Assessment/Training   Comment, (Lower Body Dressing) per clinical judgement may require assist   Grooming Assessment/Training   [place] Level (Grooming) minimum assist (75% patient effort)   Toileting   [place] Level (Toileting) contact guard assist   Clinical Impression   Criteria for Skilled Therapeutic Interventions Met (OT) Yes, treatment indicated   OT Diagnosis decreased ADLs   OT Problem List-Impairments impacting ADL problems related to;activity tolerance impaired;balance;cognition;mobility   Assessment of Occupational Performance 3-5 Performance Deficits   Identified Performance Deficits drsg, trsfs, g/h, toileting   Planned Therapy Interventions (OT) ADL retraining;transfer training;cognition   Clinical Decision Making Complexity (OT) problem focused assessment/low complexity   Risk & Benefits of therapy have been explained evaluation/treatment results reviewed;care plan/treatment goals reviewed;patient   OT Total Evaluation Time   OT Eval, Low Complexity Minutes (80240) 8   OT Goals   Therapy Frequency (OT) 6 times/week   OT Predicted Duration/Target Date for Goal Attainment 05/24/25   OT Goals Hygiene/Grooming;Lower Body Dressing;Transfers;Toilet Transfer/Toileting;Cognition   OT: Hygiene/Grooming modified independent;while standing   OT: Lower Body Dressing Modified independent   OT: Transfer Modified independent   OT: Toilet Transfer/Toileting Modified independent;cleaning and garment management   OT: Cognitive Patient/caregiver will verbalize understanding of cognitive assessment results/recommendations as needed for safe discharge planning   Interventions   Interventions Quick  Adds Self-Care/Home Management   Self-Care/Home Management   Self-Care/Home Mgmt/ADL, Compensatory, Meal Prep Minutes (44178) 10   Symptoms Noted During/After Treatment (Meal Preparation/Planning Training) fatigue   Treatment Detail/Skilled Intervention Evaluation completed. Treatment initiated. Pt sitting EOB w/ PT upom arrival.  Trsf bed>toilet w/  CGA w/ cues w/ supervision for  safety. Demo sit>supine SBA. Reclined in bed at end of session, call light in reach.   [place] Level (Grooming Training) minimum assist (75% patient effort)  (at sink, increase cues needed w/ soap use and following directions for shampoo cap)   Assistance (Grooming Training) set-up required;supervision;verbal cues   [place] Level (Toilet Training) contact guard   Assistance (Toilet Training) set-up required;supervision;verbal cues;1 person assist   Toilet Training Assistance - Assistive Device wall grab bar   OT Discharge Planning   OT Plan SLUMS/ACL, trsfs, g/h at sink, drsg, toileting   OT Discharge Recommendation (DC Rec) home  (pending progress)   OT Rationale for DC Rec Ax1 for ADLs and transfers. Anticipate will be able to d/c home when medically stable. May need A w/ driving initially   OT Brief overview of current status CGA/min A   OT Total Distance Amb During Session (feet) 40   Total Session Time   Timed Code Treatment Minutes 10   Total Session Time (sum of timed and untimed services) 18

## 2025-05-17 NOTE — PLAN OF CARE
[facility] - ICU    RN Progress Note:            Pertinent Assessments:      Please refer to flowsheet rows for full assessment     Alert and oriented, denies pain, vital signs stable, no fever. Turned and repositioned every two hours.           Key Events - This Shift:       Continues with neurological assessment every hour post op TNK.     RN Managed Protocols Ordered:  No  Protocols:Potassium and Magnesium  PRN'S:  Protocols Status: Reviewed with Oncoming RN                Barriers to Discharge / Downgrade:     Post op TNK         Point of Contact Update: YES-OR-NO: Yes  If No, reason:   Name:  Phone Number:  Summary of Conversation:

## 2025-05-17 NOTE — PROGRESS NOTES
NEUROLOGY PROGRESS NOTE     ASSESSMENT & PLAN   Visit diagnosis: Left hemispheric strokes    #Stroke  #Fluctuating symptoms  #Dysarthria    Patient received TNK  MRI confirms strokes  Electroencephalogram unremarkable  Echo is unremarkable  Start aspirin and Plavix today, as repeat CT head is stable.  Neurology will follow along  Only finding on exam is some hesitancy and very mild dysarthria.  Will await speech eval    Neurology Discharge Planning:  TBD    Patient Active Problem List    Diagnosis Date Noted    Acute CVA (cerebrovascular accident) (H) 05/16/2025     Priority: Medium    Sesamoiditis of right foot 05/17/2023     Priority: Medium     Formatting of this note might be different from the original.  Added automatically from request for surgery [id number]      Capsulitis of foot, right 05/17/2023     Priority: Medium     Formatting of this note might be different from the original.  Added automatically from request for surgery [id number]      Cortical age-related cataract of left eye 10/06/2021     Priority: Medium     Formatting of this note might be different from the original.  Added automatically from request for surgery [id number]      Metatarsalgia of left foot 06/09/2017     Priority: Medium     Formatting of this note might be different from the original.  Added automatically from request for surgery [id number]      Hammertoe of left foot 06/09/2017     Priority: Medium     Formatting of this note might be different from the original.  Added automatically from request for surgery [id number]      Hallux valgus with bunions of left foot 06/09/2017     Priority: Medium     Formatting of this note might be different from the original.  Added automatically from request for surgery [id number]      Tobacco use disorder 04/13/2017     Priority: Medium    Hypertension      Priority: Medium     Created by Conversion  Replacement Utility updated for latest IMO load        Lower Back Pain      Priority: Medium     Created by  Conversion        Lumbar Radiculopathy      Priority: Medium     Created by Conversion         Medical History  No past medical history on file.     SUBJECTIVE     69 yo woman presenting with episodes of aphasia, Right-sided weakness. Received TNK.  No fluctuations reported in AM nursing note.     Gerri tells me she is feeling back to normal.  The only thing that she notices that continues to be abnormal is her speech.  She says it is a little more hesitant, does not sound quite right to her.  Strength feels normal.  She is awaiting therapy evaluations.  She is worried about getting back to work and is hoping to work from home for a while given the current situation.     OBJECTIVE     Vital signs in last 24 hours  Temp:  [97.5  F (36.4  C)-98.4  F (36.9  C)] 98.2  F (36.8  C)  Pulse:  [56-87] 64  Resp:  [12-36] 14  BP: ()/(59-90) 169/74  SpO2:  [80 %-100 %] 95 %    Weight:   [unfilled]    Review of Systems   Pertinent items are noted in HPI.    General Physical Exam: Patient is alert and oriented x 3. Vital signs were reviewed and are documented in EMR.   Neurological Exam:  Mental status: Alert, attentive  Speech: Mildly hesitant and dysarthric  Cranial nerves: 2-12 intact  Motor: Good strength in upper and lower extremities  Reflexes: Babinski equivocal bilaterally  Sensory: Intact to light touch throughout  Coordination: Very slight tremor with finger-nose-finger on the right, no dysmetria  Gait: Deferred for safety     DIAGNOSTIC STUDIES     Pertinent Radiology   Radiology Results: Personally reviewed image/s    CT/CTA  IMPRESSION:   NONCONTRAST HEAD CT:  1.  No acute intracranial abnormality.  2.  Chronic lacunar infarct in the right corona radiata extending to the posterior right lentiform nucleus.  3.  Prominent perivascular space versus chronic lacunar infarct in the inferior basal ganglia bilaterally.     HEAD CTA:  1.  Mild narrowing of the bilateral M1 segments and bilateral PCA.  2.  No aneurysm  and no high flow vascular malformation.     NECK CTA:  1.  Mild narrowing at the left common carotid artery and left carotid bifurcation with a small ulcerative plaque at the left carotid bifurcation.  2.  Mild narrowing at the right carotid bifurcation and proximal right internal carotid artery.  3.  No high grade vertebral artery stenosis.       MRI  IMPRESSION:  Acute lacunar infarcts of the posterior limb left internal capsule and left periatrial white matter.    EEG  Normal     Echocardiogram  Interpretation Summary     Technically difficult study.  The left ventricle is normal in size. There is normal left ventricular wall  thickness.  Left ventricular systolic function is normal. The visual ejection fraction is  55-60%. No regional wall motion abnormalities noted.  Grade I or early diastolic dysfunction.     The right ventricle is normal in size and function.  The left atrium is not well visualized. Right atrium not well visualized.  IVC diameter <2.1 cm collapsing >50% with sniff suggests a normal RA pressure  of 3 mmHg.  There is no comparison study available.    Pertinent Labs   Lab Results: personally reviewed.   Recent Results (from the past 24 hours)   Basic metabolic panel    Collection Time: 05/16/25  9:49 AM   Result Value Ref Range    Sodium 138 135 - 145 mmol/L    Potassium 4.1 3.4 - 5.3 mmol/L    Chloride 102 98 - 107 mmol/L    Carbon Dioxide (CO2) 26 22 - 29 mmol/L    Anion Gap 10 7 - 15 mmol/L    Urea Nitrogen 10.8 8.0 - 23.0 mg/dL    Creatinine 0.84 0.51 - 0.95 mg/dL    GFR Estimate 74 >60 mL/min/1.73m2    Calcium 9.2 8.8 - 10.4 mg/dL    Glucose 86 70 - 99 mg/dL   INR    Collection Time: 05/16/25  9:49 AM   Result Value Ref Range    INR 0.95 0.85 - 1.15    PT 12.9 11.8 - 14.8 Seconds   Partial thromboplastin time    Collection Time: 05/16/25  9:49 AM   Result Value Ref Range    aPTT 24 22 - 38 Seconds   Troponin T, High Sensitivity    Collection Time: 05/16/25  9:49 AM   Result Value Ref Range     Troponin T, High Sensitivity <6 <=14 ng/L   CBC with platelets and differential    Collection Time: 05/16/25  9:49 AM   Result Value Ref Range    WBC Count 10.6 4.0 - 11.0 10e3/uL    RBC Count 5.05 3.80 - 5.20 10e6/uL    Hemoglobin 15.5 11.7 - 15.7 g/dL    Hematocrit 46.8 35.0 - 47.0 %    MCV 93 78 - 100 fL    MCH 30.7 26.5 - 33.0 pg    MCHC 33.1 31.5 - 36.5 g/dL    RDW 13.5 10.0 - 15.0 %    Platelet Count 407 150 - 450 10e3/uL    % Neutrophils 66 %    % Lymphocytes 26 %    % Monocytes 6 %    % Eosinophils 1 %    % Basophils 1 %    % Immature Granulocytes 0 %    NRBCs per 100 WBC 0 <1 /100    Absolute Neutrophils 7.0 1.6 - 8.3 10e3/uL    Absolute Lymphocytes 2.8 0.8 - 5.3 10e3/uL    Absolute Monocytes 0.6 0.0 - 1.3 10e3/uL    Absolute Eosinophils 0.1 0.0 - 0.7 10e3/uL    Absolute Basophils 0.1 0.0 - 0.2 10e3/uL    Absolute Immature Granulocytes 0.0 <=0.4 10e3/uL    Absolute NRBCs 0.0 10e3/uL   Hemoglobin A1c    Collection Time: 05/16/25  9:49 AM   Result Value Ref Range    Estimated Average Glucose 111 <117 mg/dL    Hemoglobin A1C 5.5 <5.7 %   Lipid panel reflex to direct LDL    Collection Time: 05/16/25  9:49 AM   Result Value Ref Range    Cholesterol 212 (H) <200 mg/dL    Triglycerides 149 <150 mg/dL    Direct Measure HDL 60 >=50 mg/dL    LDL Cholesterol Calculated 122 (H) <100 mg/dL    Non HDL Cholesterol 152 (H) <130 mg/dL   Glucose by meter    Collection Time: 05/16/25 10:22 AM   Result Value Ref Range    GLUCOSE BY METER POCT 80 70 - 99 mg/dL   Glucose by meter    Collection Time: 05/16/25  1:08 PM   Result Value Ref Range    GLUCOSE BY METER POCT 86 70 - 99 mg/dL   Troponin T, High Sensitivity    Collection Time: 05/16/25  2:09 PM   Result Value Ref Range    Troponin T, High Sensitivity <6 <=14 ng/L   Echocardiogram Complete - For age > 60 yrs    Collection Time: 05/16/25  2:30 PM   Result Value Ref Range    LVEF  55-60%    Glucose by meter    Collection Time: 05/16/25  5:07 PM   Result Value Ref Range     GLUCOSE BY METER POCT 86 70 - 99 mg/dL   Glucose by meter    Collection Time: 05/16/25  9:53 PM   Result Value Ref Range    GLUCOSE BY METER POCT 99 70 - 99 mg/dL   CBC with platelets    Collection Time: 05/17/25  4:34 AM   Result Value Ref Range    WBC Count 9.2 4.0 - 11.0 10e3/uL    RBC Count 4.82 3.80 - 5.20 10e6/uL    Hemoglobin 14.5 11.7 - 15.7 g/dL    Hematocrit 43.5 35.0 - 47.0 %    MCV 90 78 - 100 fL    MCH 30.1 26.5 - 33.0 pg    MCHC 33.3 31.5 - 36.5 g/dL    RDW 13.2 10.0 - 15.0 %    Platelet Count 315 150 - 450 10e3/uL   Basic metabolic panel    Collection Time: 05/17/25  4:34 AM   Result Value Ref Range    Sodium 141 135 - 145 mmol/L    Potassium 3.9 3.4 - 5.3 mmol/L    Chloride 110 (H) 98 - 107 mmol/L    Carbon Dioxide (CO2) 21 (L) 22 - 29 mmol/L    Anion Gap 10 7 - 15 mmol/L    Urea Nitrogen 10.1 8.0 - 23.0 mg/dL    Creatinine 0.68 0.51 - 0.95 mg/dL    GFR Estimate >90 >60 mL/min/1.73m2    Calcium 9.1 8.8 - 10.4 mg/dL    Glucose 90 70 - 99 mg/dL   INR    Collection Time: 05/17/25  4:34 AM   Result Value Ref Range    INR 0.97 0.85 - 1.15    PT 13.1 11.8 - 14.8 Seconds   Partial thromboplastin time    Collection Time: 05/17/25  4:34 AM   Result Value Ref Range    aPTT 25 22 - 38 Seconds   Glucose by meter    Collection Time: 05/17/25  8:01 AM   Result Value Ref Range    GLUCOSE BY METER POCT 96 70 - 99 mg/dL         HOSPITAL MEDICATIONS     Current Facility-Administered Medications   Medication Dose Route Frequency Provider Last Rate Last Admin    atorvastatin (LIPITOR) tablet 40 mg  40 mg Oral QPM Bernardo Carballo MD   40 mg at 05/16/25 2031    fluticasone-vilanterol (BREO ELLIPTA) 100-25 MCG/ACT inhaler 1 puff  1 puff Inhalation Daily Bernardo Carballo MD   1 puff at 05/17/25 0745    [Held by provider] gabapentin (NEURONTIN) capsule 300 mg  300 mg Oral QPM Bernardo Carballo MD        [Held by provider] gabapentin (NEURONTIN) capsule 600 mg  600 mg Oral QAM Bernardo Carballo MD        [Held by provider]  lisinopril (ZESTRIL) tablet 10 mg  10 mg Oral Daily Bernardo Carballo MD        sodium chloride (PF) 0.9% PF flush 3 mL  3 mL Intracatheter Q8H COLTON Bernardo Carballo MD            Total time spent for face to face visit, reviewing labs/imaging studies, counseling and coordination of care was: 1 Hour 15 Minutes. More than 50% of this time was spent on counseling and coordination of care.    Dragon software used in the dictation on this note.    Celia Rogers MD  [facility] - [address], [address]  [place] [phone]

## 2025-05-17 NOTE — PLAN OF CARE
"Goal Outcome Evaluation:  [facility] - ICU    RN Progress Note:            Pertinent Assessments:      Please refer to flowsheet rows for full assessment     Pt arrived from the ED at 2hrs post TNK Having symptoms of Rt arm weekness, Slurred speech, Tongue deviation, rt facial droop, blurry vision Rt eye, difficultly word finding. Symptoms often resolve then same symptoms restart an hour later. NIH as high as 7 then goes to 0. She knows when she is going in and out the \"episodes\"            Key Events - This Shift:       Notified Neuro related to frequent changes. Ordered 1L of NS and have head of  bed as flat as she can tolerate.  Start q 1hr Neuros at 2000. Gave Stroke book, Discussed Symptoms, treatment, modifiable risks.                 Barriers to Discharge / Downgrade:     Frequent neuro checks.      Friend at bedside.                              "

## 2025-05-17 NOTE — PROGRESS NOTES
"   05/17/25 8447   Appointment Info   Signing Clinician's Name / Credentials (PT) Nuha Faith PT   Living Environment   People in Home alone   Current Living Arrangements other (see comments)  (cottage)   Home Accessibility no concerns   Transportation Anticipated car, drives self   Self-Care   Equipment Currently Used at Home none   Activity/Exercise/Self-Care Comment independent ADL/IADL's; works   General Information   Onset of Illness/Injury or Date of Surgery 05/16/25   Referring Physician Dr. Graham   Patient/Family Therapy Goals Statement (PT) speak right; \"I'm so tired and weak\"   Pertinent History of Current Problem (include personal factors and/or comorbidities that impact the POC) CVA, lacunar infarct post. limb int. capsule s/p TNK 5/16   Existing Precautions/Restrictions no known precautions/restrictions   Cognition   Affect/Mental Status (Cognition) low arousal/lethargic   Orientation Status (Cognition) oriented x 4   Follows Commands (Cognition) WFL   Range of Motion (ROM)   Range of Motion ROM is WFL   Strength (Manual Muscle Testing)   Strength (Manual Muscle Testing) strength is WFL   Bed Mobility   Bed Mobility supine-sit   Supine-Sit [place] (Bed Mobility) independent   Transfers   Transfers sit-stand transfer;toilet transfer   Sit-Stand Transfer   Sit-Stand [place] (Transfers) supervision   Toilet Transfer   Type (Toilet Transfer) stand-sit;sit-stand   [place] Level (Toilet Transfer) supervision   Gait/Stairs (Locomotion)   [place] Level (Gait) supervision   Assistive Device (Gait) other (see comments)  (none)   Distance in Feet (Gait) 10, 60   Pattern (Gait) step-through   Deviations/Abnormal Patterns (Gait) gait speed decreased;other (see comments)  (mild path deviation; leans L hand along wall at times for balance)   Balance   Balance Comments static standing independent; dynamic standing sba   Clinical Impression   Criteria for Skilled Therapeutic Intervention Yes, " treatment indicated   PT Diagnosis (PT) impaired functional mobility   Influenced by the following impairments decreased balance, alertness, activity tolerance   Functional limitations due to impairments transfers, gait   Clinical Presentation (PT Evaluation Complexity) stable   Clinical Presentation Rationale pt presents as medically diagnosed   Clinical Decision Making (Complexity) low complexity   Planned Therapy Interventions (PT) balance training;gait training;home exercise program;neuromuscular re-education;patient/family education;strengthening;transfer training   Risk & Benefits of therapy have been explained evaluation/treatment results reviewed;patient   PT Total Evaluation Time   PT Eval, Low Complexity Minutes (11357) 20   Physical Therapy Goals   PT Frequency Daily   PT Predicted Duration/Target Date for Goal Attainment 05/24/25   PT Goals Transfers;Gait   PT: Transfers Independent;Sit to/from stand;Bed to/from chair   PT: Gait Independent;150 feet   PT Discharge Planning   PT Plan gait no AD; standing balance   PT Discharge Recommendation (DC Rec) home   PT Rationale for DC Rec anticipate pt will be able to return home independently   PT Brief overview of current status amb. 60 feet no AD sba   PT Total Distance Amb During Session (feet) 70   PT Equipment Needed at Discharge   (none)   Physical Therapy Time and Intention   Total Session Time (sum of timed and untimed services) 20

## 2025-05-17 NOTE — PROGRESS NOTES
[facility]    Medicine Progress Note - Hospitalist Service    Date of Admission:  5/16/2025    Assessment & Plan   Gerri Stallings is a 70-year-old woman who presents with strokelike symptoms of slurred speech and right-sided deficits.  Have confirmed acute lacunar infarcts on MRI within the left internal capsule and left parietal white matter.    Acute ischemic stroke s/p TNK  - Patient received TNK on arrival yesterday, repeat CT head is stable  - Appreciate neurology recommendations  - Will initiate Plavix and aspirin today  - Risk factor modification: A1c 5.5, LDL and total cholesterol elevated, patient is a tobacco user  - Continue high intensity atorvastatin  - Tobacco cessation discussed, patient showed motivation to quit.  Deferring patch and lozenge at the moment  - Restart PTA lisinopril  - Continue with stroke protocol cares, PT/OT/speech therapy recs pending. Additional work-up per protocol has been completed    HTN  - PTA lisinopril    Tobacco use  - Cessation discussed  - PTA inhalers continued           Diet: Combination Diet 2 gm NA Diet; Thin Liquids (level 0)    DVT Prophylaxis: SCDs  Goldberg Catheter: Not present  Lines: None     Cardiac Monitoring: ACTIVE order. Indication: Tachyarrhythmias, acute (48 hours)  Code Status: Full Code      Clinically Significant Risk Factors          # Hyperchloremia: Highest Cl = 110 mmol/L in last 2 days, will monitor as appropriate              # Hypertension: Noted on problem list                       Social Drivers of Health    Tobacco Use: High Risk (4/14/2025)    Received from [employer]    Patient History     Smoking Tobacco Use: Every Day     Smokeless Tobacco Use: Never          Disposition Plan     Medically Ready for Discharge: Anticipated Tomorrow             Evelio Graham DO  Hospitalist Service  [facility]  Securely message with [employer] (more info)  Text page via [employer] Paging/Directory    ______________________________________________________________________    Interval History   Patient feeling much better today.  No visual changes, headache, dizziness.  States that right sided deficits are much improved    Physical Exam   Vital Signs: Temp: 98.2  F (36.8  C) Temp src: Oral BP: (!) 146/87 Pulse: 64   Resp: 19 SpO2: (!) 89 % O2 Device: None (Room air)    Weight: 158 lbs 4.8 oz    General Appearance: No acute distress  Respiratory: Nonlabored and breathing comfortably on room air  Cardiovascular: RRR without ectopy  GI: No pain  Skin: No exposed rash  Other: Sensation and strength grossly intact throughout    Medical Decision Making       60 MINUTES SPENT BY ME on the date of service doing chart review, history, exam, documentation & further activities per the note.      Data     I have personally reviewed the following data over the past 24 hrs:    9.2  \   14.5   / 315     141 110 (H) 10.1 /  96   3.9 21 (L) 0.68 \     INR:  0.97 PTT:  25   D-dimer:  N/A Fibrinogen:  N/A       Imaging results reviewed over the past 24 hrs:   Recent Results (from the past 24 hours)   MR Brain w/o & w Contrast    Narrative    EXAM: MR BRAIN W/O and W CONTRAST  LOCATION: [facility]  DATE: 5/16/2025    INDICATION: r weakness  dysphasia  COMPARISON: Chest CTA head and neck 5/16/2025  CONTRAST: 7 ml Gadavist  TECHNIQUE: Routine multiplanar multisequence head MRI without and with intravenous contrast.    FINDINGS:  INTRACRANIAL CONTENTS: There is 13 x 6 mm focus of restricted diffusion at the posterior limb of left internal capsule consistent with acute lacunar infarct. There is additional punctate focus of acute lacunar infarct measuring 4 mm at the left   periatrial white matter. No mass, acute hemorrhage, or extra-axial fluid collections. Scattered nonspecific T2/FLAIR hyperintensities within the cerebral white matter most consistent with mild chronic microvascular ischemic change. There is  chronic   lacunar infarct of the right corona radiata. There is prominent perivascular space at the inferior left basal ganglia. Mild generalized cerebral atrophy. No hydrocephalus. Normal position of the cerebellar tonsils. No pathologic contrast enhancement.   There is developmental venous anomaly of the left frontal lobe.    SELLA: No abnormality accounting for technique.    OSSEOUS STRUCTURES/SOFT TISSUES: Normal marrow signal. The major intracranial vascular flow voids are maintained.     ORBITS: Prior bilateral cataract surgery. Visualized portions of the orbits are otherwise unremarkable.     SINUSES/MASTOIDS: No paranasal sinus mucosal disease. No middle ear or mastoid effusion.       Impression    IMPRESSION:  Acute lacunar infarcts of the posterior limb left internal capsule and left periatrial white matter.     CT Head w/o Contrast    Narrative    EXAM: CT HEAD WITHOUT CONTRAST  LOCATION: [facility]  DATE: 05/17/2025    INDICATION: Post TNK, stroke.  COMPARISON: MRI of the brain dated 05/16/2025. CTA head and neck 05/16/2025.  TECHNIQUE: Routine CT Head without IV contrast. Multiplanar reformats. Dose reduction techniques were used.    FINDINGS:  INTRACRANIAL CONTENTS: The ventricles appear normal in size and configuration. Small chronic infarct in the right lentiform nucleus/corona radiata, unchanged. The small foci of restricted diffusion presumably representing recent infarcts involving the   left lentiform nucleus/posterior limb of the internal capsule and periventricular region are not well-seen on this noncontrast CT exam. Unchanged dilated perivascular space in the left lentiform nucleus region. No new large transcortical acute or   subacute infarct. Minimal presumed chronic small vessel ischemic changes. Mild generalized brain parenchymal volume loss. No hyperdense acute intracranial hemorrhage, extra-axial fluid collection, or mass effect.    VISUALIZED  ORBITS/SINUSES/MASTOIDS: Prior bilateral cataract surgery. Visualized portions of the orbits are otherwise unremarkable. Mild mucosal thickening in the ethmoid sinuses. No middle ear or mastoid effusion.    BONES/SOFT TISSUES: No acute abnormality.      Impression    IMPRESSION:  1.  No acute intracranial hemorrhage, extra-axial fluid collection, or mass effect.  2.  The small foci of restricted diffusion presumably representing recent infarcts involving the left lentiform nucleus/posterior limb of the internal capsule and periventricular region are not well-seen on this noncontrast CT exam.  3.  Unchanged small chronic infarct in the right lentiform nucleus/corona radiata.

## 2025-05-18 ENCOUNTER — APPOINTMENT (OUTPATIENT)
Dept: PHYSICAL THERAPY | Facility: HOSPITAL | Age: 70
DRG: 062 | End: 2025-05-18
Payer: COMMERCIAL

## 2025-05-18 ENCOUNTER — APPOINTMENT (OUTPATIENT)
Dept: CT IMAGING | Facility: HOSPITAL | Age: 70
DRG: 062 | End: 2025-05-18
Attending: HOSPITALIST
Payer: COMMERCIAL

## 2025-05-18 LAB
ANION GAP SERPL CALCULATED.3IONS-SCNC: 13 MMOL/L (ref 7–15)
ATRIAL RATE - MUSE: 75 BPM
BUN SERPL-MCNC: 9.1 MG/DL (ref 8–23)
CALCIUM SERPL-MCNC: 9.6 MG/DL (ref 8.8–10.4)
CHLORIDE SERPL-SCNC: 106 MMOL/L (ref 98–107)
CREAT SERPL-MCNC: 0.63 MG/DL (ref 0.51–0.95)
DIASTOLIC BLOOD PRESSURE - MUSE: NORMAL MMHG
EGFRCR SERPLBLD CKD-EPI 2021: >90 ML/MIN/1.73M2
GLUCOSE BLDC GLUCOMTR-MCNC: 101 MG/DL (ref 70–99)
GLUCOSE BLDC GLUCOMTR-MCNC: 101 MG/DL (ref 70–99)
GLUCOSE BLDC GLUCOMTR-MCNC: 107 MG/DL (ref 70–99)
GLUCOSE BLDC GLUCOMTR-MCNC: 138 MG/DL (ref 70–99)
GLUCOSE SERPL-MCNC: 101 MG/DL (ref 70–99)
HCO3 SERPL-SCNC: 20 MMOL/L (ref 22–29)
INTERPRETATION ECG - MUSE: NORMAL
MAGNESIUM SERPL-MCNC: 1.8 MG/DL (ref 1.7–2.3)
P AXIS - MUSE: 81 DEGREES
POTASSIUM SERPL-SCNC: 3.3 MMOL/L (ref 3.4–5.3)
POTASSIUM SERPL-SCNC: 3.9 MMOL/L (ref 3.4–5.3)
PR INTERVAL - MUSE: 150 MS
QRS DURATION - MUSE: 98 MS
QT - MUSE: 450 MS
QTC - MUSE: 502 MS
R AXIS - MUSE: -20 DEGREES
SODIUM SERPL-SCNC: 139 MMOL/L (ref 135–145)
SYSTOLIC BLOOD PRESSURE - MUSE: NORMAL MMHG
T AXIS - MUSE: 71 DEGREES
TROPONIN T SERPL HS-MCNC: <6 NG/L
VENTRICULAR RATE- MUSE: 75 BPM

## 2025-05-18 PROCEDURE — 93010 ELECTROCARDIOGRAM REPORT: CPT | Performed by: INTERNAL MEDICINE

## 2025-05-18 PROCEDURE — 99233 SBSQ HOSP IP/OBS HIGH 50: CPT | Performed by: STUDENT IN AN ORGANIZED HEALTH CARE EDUCATION/TRAINING PROGRAM

## 2025-05-18 PROCEDURE — 97116 GAIT TRAINING THERAPY: CPT | Mod: GP

## 2025-05-18 PROCEDURE — 70450 CT HEAD/BRAIN W/O DYE: CPT

## 2025-05-18 PROCEDURE — 80048 BASIC METABOLIC PNL TOTAL CA: CPT | Performed by: HOSPITALIST

## 2025-05-18 PROCEDURE — 120N000001 HC R&B MED SURG/OB

## 2025-05-18 PROCEDURE — 93005 ELECTROCARDIOGRAM TRACING: CPT

## 2025-05-18 PROCEDURE — 84484 ASSAY OF TROPONIN QUANT: CPT | Performed by: HOSPITALIST

## 2025-05-18 PROCEDURE — 36415 COLL VENOUS BLD VENIPUNCTURE: CPT | Performed by: STUDENT IN AN ORGANIZED HEALTH CARE EDUCATION/TRAINING PROGRAM

## 2025-05-18 PROCEDURE — 97110 THERAPEUTIC EXERCISES: CPT | Mod: GP

## 2025-05-18 PROCEDURE — 250N000011 HC RX IP 250 OP 636: Performed by: INTERNAL MEDICINE

## 2025-05-18 PROCEDURE — 83735 ASSAY OF MAGNESIUM: CPT | Performed by: HOSPITALIST

## 2025-05-18 PROCEDURE — 84132 ASSAY OF SERUM POTASSIUM: CPT | Performed by: STUDENT IN AN ORGANIZED HEALTH CARE EDUCATION/TRAINING PROGRAM

## 2025-05-18 PROCEDURE — 250N000013 HC RX MED GY IP 250 OP 250 PS 637: Performed by: HOSPITALIST

## 2025-05-18 PROCEDURE — 99231 SBSQ HOSP IP/OBS SF/LOW 25: CPT | Performed by: PSYCHIATRY & NEUROLOGY

## 2025-05-18 PROCEDURE — 36415 COLL VENOUS BLD VENIPUNCTURE: CPT | Performed by: HOSPITALIST

## 2025-05-18 PROCEDURE — 250N000013 HC RX MED GY IP 250 OP 250 PS 637: Performed by: STUDENT IN AN ORGANIZED HEALTH CARE EDUCATION/TRAINING PROGRAM

## 2025-05-18 PROCEDURE — 250N000011 HC RX IP 250 OP 636: Mod: JZ | Performed by: PSYCHIATRY & NEUROLOGY

## 2025-05-18 PROCEDURE — 93005 ELECTROCARDIOGRAM TRACING: CPT | Performed by: HOSPITALIST

## 2025-05-18 PROCEDURE — 250N000013 HC RX MED GY IP 250 OP 250 PS 637: Performed by: INTERNAL MEDICINE

## 2025-05-18 RX ORDER — PRAMIPEXOLE DIHYDROCHLORIDE 0.12 MG/1
0.12 TABLET ORAL 3 TIMES DAILY
Status: DISCONTINUED | OUTPATIENT
Start: 2025-05-18 | End: 2025-05-18

## 2025-05-18 RX ORDER — POTASSIUM CHLORIDE 1500 MG/1
40 TABLET, EXTENDED RELEASE ORAL ONCE
Status: COMPLETED | OUTPATIENT
Start: 2025-05-18 | End: 2025-05-18

## 2025-05-18 RX ADMIN — PRAMIPEXOLE DIHYDROCHLORIDE 0.12 MG: 0.12 TABLET ORAL at 08:19

## 2025-05-18 RX ADMIN — ATORVASTATIN CALCIUM 40 MG: 40 TABLET, FILM COATED ORAL at 22:27

## 2025-05-18 RX ADMIN — FLUTICASONE FUROATE AND VILANTEROL TRIFENATATE 1 PUFF: 100; 25 POWDER RESPIRATORY (INHALATION) at 08:22

## 2025-05-18 RX ADMIN — ACETAMINOPHEN 325 MG: 325 TABLET ORAL at 16:14

## 2025-05-18 RX ADMIN — LISINOPRIL 10 MG: 5 TABLET ORAL at 08:19

## 2025-05-18 RX ADMIN — PRAMIPEXOLE DIHYDROCHLORIDE 0.12 MG: 0.12 TABLET ORAL at 03:48

## 2025-05-18 RX ADMIN — CLOPIDOGREL BISULFATE 75 MG: 75 TABLET, FILM COATED ORAL at 08:19

## 2025-05-18 RX ADMIN — ASPIRIN 81 MG: 81 TABLET, COATED ORAL at 08:19

## 2025-05-18 RX ADMIN — ACETAMINOPHEN 325 MG: 325 TABLET ORAL at 02:49

## 2025-05-18 RX ADMIN — HYDRALAZINE HYDROCHLORIDE 10 MG: 20 INJECTION INTRAMUSCULAR; INTRAVENOUS at 04:04

## 2025-05-18 RX ADMIN — POTASSIUM CHLORIDE 40 MEQ: 1500 TABLET, EXTENDED RELEASE ORAL at 06:30

## 2025-05-18 RX ADMIN — HYDRALAZINE HYDROCHLORIDE 10 MG: 20 INJECTION INTRAMUSCULAR; INTRAVENOUS at 00:05

## 2025-05-18 RX ADMIN — ONDANSETRON 4 MG: 2 INJECTION, SOLUTION INTRAMUSCULAR; INTRAVENOUS at 05:19

## 2025-05-18 ASSESSMENT — ACTIVITIES OF DAILY LIVING (ADL)
ADLS_ACUITY_SCORE: 36
ADLS_ACUITY_SCORE: 44
ADLS_ACUITY_SCORE: 43
ADLS_ACUITY_SCORE: 39
ADLS_ACUITY_SCORE: 44
ADLS_ACUITY_SCORE: 43
ADLS_ACUITY_SCORE: 44
ADLS_ACUITY_SCORE: 44
ADLS_ACUITY_SCORE: 43
ADLS_ACUITY_SCORE: 44
ADLS_ACUITY_SCORE: 43

## 2025-05-18 NOTE — PROGRESS NOTES
[facility] - ICU Admission Note       Dual Skin Assessment:     Patient admitted from ED to ICU.      Comprehensive skin inspection completed by myself and Sherin Schwartz RN.      Abnormal skin assessment findings: No      If yes above, LDA initiated for skin breakdown/non-blanchable erythema:  No     Provider notified: N/A     WOC consult order obtained: N/A

## 2025-05-18 NOTE — PROGRESS NOTES
Patient had unconscious episode while she was on toiled this morning, regained conscious very quickly. She was helped back to bed,vital signs stable, neuro intact. MD notified, CT and EKG ordered also troponin as well. Family friend Divine was updated.

## 2025-05-18 NOTE — PROGRESS NOTES
Reported unconscious episode this morning, per report but now back to baseline, neuro is intact.  Will follow with CT head and EKG with troponins as well.  Slightly low potassium will give oral, magnesium normal.

## 2025-05-18 NOTE — PLAN OF CARE
Physical Therapy Discharge Summary    Reason for therapy discharge:    All goals and outcomes met, no further needs identified.    Progress towards therapy goal(s). See goals on Care Plan in [facility] electronic health record for goal details.  Goals met    Therapy recommendation(s):    Continued therapy is recommended.  Rationale/Recommendations:  Continue with OP PT.  Continue home exercise program.

## 2025-05-18 NOTE — PLAN OF CARE
Goal Outcome Evaluation:       [facility] - ICU    RN Progress Note:            Pertinent Assessments:      Please refer to flowsheet rows for full assessment     Pt is Alert and Oriented had slight dysarthria and unsteady while getting up to bathroom and chair throughout day but improved dramatically at 1800 neuro check. Up SBA. SBPS elevated above 180 and Hydralazine given x1 but SBPS did drop to 110s. Was very teary eyed most of shift. Couldn't seem to get past the idea that this is a minor set back and feels like she will not recover.            Key Events - This Shift:       Spent a lot of time just giving emotional support. Talked about starting small with modifiable risk improvements. Started on Plavix and ASA today. Therapy wasn't ready to sign off today. Possibly tomorrow. She is in need of sleep. Slept in small increments today,      RN Managed Protocols Ordered:  Yes  Protocols:Potassium and Magnesium  PRN'S:  Protocols Status: Reviewed with Oncoming RN                Barriers to Discharge / Downgrade:     Did downgrade from ICU to Neuro/tele

## 2025-05-18 NOTE — PLAN OF CARE
[facility] - ICU    RN Progress Note:            Pertinent Assessments:      Please refer to flowsheet rows for full assessment     Alert and oriented, denies pain vital signs stable, able to ambulate to the bathroom.           Key Events - This Shift:       Uneventful     RN Managed Protocols Ordered:  Yes  Protocols:Potassium and Magnesium  PRN'S:  Protocols Status: Reviewed with Oncoming RN                Barriers to Discharge / Downgrade:     None         Point of Contact Update: YES-OR-NO: Yes  If No, reason:   Name:  Phone Number:  Summary of Conversation:                                    Detail Level: Zone Detail Level: Generalized Detail Level: Simple Detail Level: Detailed

## 2025-05-18 NOTE — PROGRESS NOTES
Neurology Note:    Head CT overnight due to brief LOC. Nothing acute.   Previous speech assessment notes no dysarthria at that time (5/16)  On Exam, no dysarthria today.     Nurse points out that the Mirapex she is currently on is a new medication for Gerri.  Gerri says she has a little bit of twitching in the legs but it does not really bother her and there is some concern that the medication is making her tired so I will discontinue it.    No change in plan from a neurologic standpoint.  We will plan on dual antiplatelet therapy for 3 weeks and then aspirin monotherapy thereafter (325mg daily).    We discussed modifiable risk factors including smoking cessation at bedside.    Celia Rogers MD

## 2025-05-18 NOTE — PROGRESS NOTES
[facility]    Medicine Progress Note - Hospitalist Service    Date of Admission:  5/16/2025    Assessment & Plan   Gerri Stallings is a 70-year-old woman who presents with strokelike symptoms of slurred speech and right-sided deficits.  Have confirmed acute lacunar infarcts on MRI within the left internal capsule and left parietal white matter.    Anticipate discharge tomorrow 5/19/25 pending BP trend, neurology and therapy clearance.     Acute ischemic stroke s/p TNK  - Patient received TNK on arrival yesterday, repeat CT head is stable  - Appreciate neurology recommendations  - Will initiate Plavix and aspirin today  - Risk factor modification: A1c 5.5, LDL and total cholesterol elevated, patient is a tobacco user  - Continue high intensity atorvastatin  - Tobacco cessation discussed, patient showed motivation to quit.  Deferring patch and lozenge at the moment  - Restart PTA lisinopril  - Continue with stroke protocol cares, PT/OT/speech therapy recs pending. Additional work-up per protocol has been completed    HTN  - PTA lisinopril    Tobacco use  - Cessation discussed  - PTA inhalers continued           Diet: Combination Diet 2 gm NA Diet; Thin Liquids (level 0)    DVT Prophylaxis: SCDs  Goldberg Catheter: Not present  Lines: None     Cardiac Monitoring: ACTIVE order. Indication: Tachyarrhythmias, acute (48 hours)  Code Status: Full Code      Clinically Significant Risk Factors        # Hypokalemia: Lowest K = 3.3 mmol/L in last 2 days, will replace as needed   # Hyperchloremia: Highest Cl = 110 mmol/L in last 2 days, will monitor as appropriate              # Hypertension: Noted on problem list                       Social Drivers of Health    Tobacco Use: High Risk (4/14/2025)    Received from [employer]    Patient History     Smoking Tobacco Use: Every Day     Smokeless Tobacco Use: Never          Disposition Plan     Medically Ready for Discharge: Anticipated Tomorrow              Evelio Graham DO  Hospitalist Service  [facility]  Securely message with [employer] (more info)  Text page via [employer] Paging/Directory   ______________________________________________________________________    Interval History   Feeling stable today, mild anxiety. No headache or visual changes     Physical Exam   Vital Signs: Temp: 98.3  F (36.8  C) Temp src: Oral BP: (!) 145/59 Pulse: 82   Resp: 16 SpO2: 98 % O2 Device: None (Room air)    Weight: 158 lbs 4.8 oz    General Appearance: No acute distress  Respiratory: Nonlabored and breathing comfortably on room air  Cardiovascular: RRR without ectopy  GI: No pain  Skin: No exposed rash  Other: Sensation and strength grossly intact throughout    Medical Decision Making       50 MINUTES SPENT BY ME on the date of service doing chart review, history, exam, documentation & further activities per the note.      Data     I have personally reviewed the following data over the past 24 hrs:    N/A  \   N/A   / N/A     139 106 9.1 /  138 (H)   3.9 20 (L) 0.63 \     Trop: <6 BNP: N/A       Imaging results reviewed over the past 24 hrs:   Recent Results (from the past 24 hours)   CT Head w/o Contrast    Narrative    EXAM: CT HEAD WITHOUT CONTRAST  LOCATION: [facility]  DATE: 05/18/2025    INDICATION: Unconsciousness episode.  COMPARISON: None.  TECHNIQUE: Routine CT Head without IV contrast. Multiplanar reformats. Dose reduction techniques were used.    FINDINGS:  INTRACRANIAL CONTENTS: No intracranial hemorrhage, extra-axial collection, or mass effect.  No CT evidence of acute infarct. Mild presumed chronic small vessel ischemic changes. Normal ventricles and sulci.     VISUALIZED ORBITS/SINUSES/MASTOIDS: No intraorbital abnormality. No paranasal sinus mucosal disease. No middle ear or mastoid effusion.    BONES/SOFT TISSUES: No acute abnormality.      Impression    IMPRESSION:  1.  No acute intracranial process.

## 2025-05-19 VITALS
OXYGEN SATURATION: 93 % | TEMPERATURE: 98.4 F | BODY MASS INDEX: 26.01 KG/M2 | SYSTOLIC BLOOD PRESSURE: 140 MMHG | DIASTOLIC BLOOD PRESSURE: 70 MMHG | HEIGHT: 64 IN | WEIGHT: 152.34 LBS | RESPIRATION RATE: 16 BRPM | HEART RATE: 75 BPM

## 2025-05-19 LAB
ANION GAP SERPL CALCULATED.3IONS-SCNC: 10 MMOL/L (ref 7–15)
BUN SERPL-MCNC: 9.4 MG/DL (ref 8–23)
CALCIUM SERPL-MCNC: 9.5 MG/DL (ref 8.8–10.4)
CHLORIDE SERPL-SCNC: 108 MMOL/L (ref 98–107)
CREAT SERPL-MCNC: 0.69 MG/DL (ref 0.51–0.95)
EGFRCR SERPLBLD CKD-EPI 2021: >90 ML/MIN/1.73M2
GLUCOSE BLDC GLUCOMTR-MCNC: 83 MG/DL (ref 70–99)
GLUCOSE SERPL-MCNC: 94 MG/DL (ref 70–99)
HCO3 SERPL-SCNC: 21 MMOL/L (ref 22–29)
HOLD SPECIMEN: NORMAL
POTASSIUM SERPL-SCNC: 3.7 MMOL/L (ref 3.4–5.3)
SODIUM SERPL-SCNC: 139 MMOL/L (ref 135–145)

## 2025-05-19 PROCEDURE — 999N000226 HC STATISTIC SLP IP EVAL DEFER

## 2025-05-19 PROCEDURE — 250N000013 HC RX MED GY IP 250 OP 250 PS 637: Performed by: STUDENT IN AN ORGANIZED HEALTH CARE EDUCATION/TRAINING PROGRAM

## 2025-05-19 PROCEDURE — 99239 HOSP IP/OBS DSCHRG MGMT >30: CPT | Performed by: STUDENT IN AN ORGANIZED HEALTH CARE EDUCATION/TRAINING PROGRAM

## 2025-05-19 PROCEDURE — 36415 COLL VENOUS BLD VENIPUNCTURE: CPT | Performed by: HOSPITALIST

## 2025-05-19 PROCEDURE — 80048 BASIC METABOLIC PNL TOTAL CA: CPT | Performed by: HOSPITALIST

## 2025-05-19 PROCEDURE — 99231 SBSQ HOSP IP/OBS SF/LOW 25: CPT | Performed by: PSYCHIATRY & NEUROLOGY

## 2025-05-19 RX ORDER — ATORVASTATIN CALCIUM 40 MG/1
40 TABLET, FILM COATED ORAL EVERY EVENING
Qty: 30 TABLET | Refills: 0 | Status: SHIPPED | OUTPATIENT
Start: 2025-05-19 | End: 2025-05-22

## 2025-05-19 RX ORDER — CLOPIDOGREL BISULFATE 75 MG/1
75 TABLET ORAL DAILY
Qty: 20 TABLET | Refills: 0 | Status: SHIPPED | OUTPATIENT
Start: 2025-05-19

## 2025-05-19 RX ORDER — ASPIRIN 81 MG/1
81 TABLET, COATED ORAL DAILY
Qty: 20 TABLET | Refills: 0 | Status: SHIPPED | OUTPATIENT
Start: 2025-05-19 | End: 2025-05-22

## 2025-05-19 RX ORDER — POLYETHYLENE GLYCOL 3350 17 G
2 POWDER IN PACKET (EA) ORAL
Qty: 60 LOZENGE | Refills: 0 | Status: SHIPPED | OUTPATIENT
Start: 2025-05-19

## 2025-05-19 RX ADMIN — FLUTICASONE FUROATE AND VILANTEROL TRIFENATATE 1 PUFF: 100; 25 POWDER RESPIRATORY (INHALATION) at 08:01

## 2025-05-19 RX ADMIN — ASPIRIN 81 MG: 81 TABLET, COATED ORAL at 08:01

## 2025-05-19 RX ADMIN — LISINOPRIL 10 MG: 5 TABLET ORAL at 08:01

## 2025-05-19 RX ADMIN — CLOPIDOGREL BISULFATE 75 MG: 75 TABLET, FILM COATED ORAL at 08:01

## 2025-05-19 ASSESSMENT — ACTIVITIES OF DAILY LIVING (ADL)
ADLS_ACUITY_SCORE: 38
ADLS_ACUITY_SCORE: 39
ADLS_ACUITY_SCORE: 38
ADLS_ACUITY_SCORE: 39
ADLS_ACUITY_SCORE: 39

## 2025-05-19 NOTE — PLAN OF CARE
Goal Outcome Evaluation:      Plan of Care Reviewed With: patient    Overall Patient Progress: improvingOverall Patient Progress: improving     Patient scored a 0 on NIHSS. Left a sticky note for provider to address/discontinue the ICU orders. PERRLA. Reporting mild headache, Tylenol effective. Alert and oriented x4.

## 2025-05-19 NOTE — PLAN OF CARE
Occupational Therapy Discharge Summary    Reason for therapy discharge:    Discharged to Home with friend and outpt. PT    Progress towards therapy goal(s). See goals on Care Plan in [facility] electronic health record for goal details.  Goals partially met.  Barriers to achieving goals:   discharge from facility.    Therapy recommendation(s):    No further therapy is recommended.Outpt. PT

## 2025-05-19 NOTE — CONSULTS
"SW received automatic consult for \"stroke\". Per chart review, patient is being discharge to home today. PT notes indicate patient plans to stay with grand daughter for a few days and continue with outpatient PT.     No further care management intervention anticipated at this time.  Please re-consult if further needs arise.  Care management signing off.        TERRY Kurtz at 8:49 AM on 5/19/2025    "

## 2025-05-19 NOTE — PLAN OF CARE
Problem: Adult Inpatient Plan of Care  Goal: Plan of Care Review  Description: The Plan of Care Review/Shift note should be completed every shift.  The Outcome Evaluation is a brief statement about your assessment that the patient is improving, declining, or no change.  This information will be displayed automatically on your shiftnote.  Outcome: Met   Goal Outcome Evaluation:       Pt alert and oriented x4 and neuros are intact. Pt denies pain when asked. Pt independent in the room. NSR on the tele monitor. Pt verbalized discharge instructions. Pt discharging home with friend.

## 2025-05-19 NOTE — DISCHARGE SUMMARY
"[facility]  Hospitalist Discharge Summary      Date of Admission:  5/16/2025  Date of Discharge:  5/19/2025  Discharging Provider: Evelio Graham DO  Discharge Service: Hospitalist Service    Discharge Diagnoses   Active Problems:    Acute CVA (cerebrovascular accident) (H)        Clinically Significant Risk Factors     # Overweight: Estimated body mass index is 26.15 kg/m  as calculated from the following:    Height as of this encounter: 1.626 m (5' 4\").    Weight as of this encounter: 69.1 kg (152 lb 5.4 oz).       Follow-ups Needed After Discharge   Follow-up Appointments       Follow Up      Follow up with neurology in 2 weeks.        Hospital Follow-up with Existing Primary Care Provider (PCP)          Schedule Primary Care visit within: 7 Days               Discharge Disposition   Discharged to home  Condition at discharge: Stable    Hospital Course   Gerri Stallings is a 70-year-old woman who presents with strokelike symptoms of slurred speech and right-sided deficits.  Have confirmed acute lacunar infarcts on MRI within the left internal capsule and left parietal white matter.    Anticipate discharge tomorrow 5/19/25 pending BP trend, neurology and therapy clearance.     Acute ischemic stroke s/p TNK  - Patient received TNK on arrival to ER, repeat CT head is stable. Risk factor modification: A1c 5.5, LDL and total cholesterol elevated, patient is a tobacco user  - Continue initiate Plavix and aspirin 3 weeks, followed by ASA monotherapy thereafter. Patient to follow-up with neurology in 2 weeks   - Continue high intensity atorvastatin  - Tobacco cessation discussed, patient showed motivation to quit  - PTA lisinopril  - Continue with stroke protocol cares, PT/OT/speech therapy recs pending. Additional work-up per protocol has been completed    HTN  - PTA lisinopril    Tobacco use  - Cessation discussed  - Patient agreeable to patch and lozenge on discharge     Consultations This " Hospital Stay   NEUROLOGY IP STROKE CONSULT  SPEECH LANGUAGE PATH ADULT IP CONSULT  PHARMACY IP CONSULT  PHARMACY IP CONSULT  PHYSICAL THERAPY ADULT IP CONSULT  OCCUPATIONAL THERAPY ADULT IP CONSULT  REHAB ADMISSIONS LIAISON IP CONSULT  CARE MANAGEMENT / SOCIAL WORK IP CONSULT  SMOKING CESSATION PROGRAM IP CONSULT    Code Status   Full Code    Time Spent on this Encounter   IEvelio DO, personally saw the patient today and spent greater than 30 minutes discharging this patient.       Evelio Graham DO  [address] [address]  Phone: [phone]  Fax: [phone]  ______________________________________________________________________    Physical Exam   Vital Signs: Temp: 97.8  F (36.6  C) Temp src: Oral BP: (!) 140/63 Pulse: 61   Resp: 16 SpO2: 92 % O2 Device: None (Room air)    Weight: 152 lbs 5.41 oz  General Appearance:  No acute distress, alert and cooperative   Respiratory: Nonlabored and breathing comfortably on room air  Cardiovascular: RRR without ectopy  GI: No pain  Skin: No exposed rash  Other:  Sensation and strength grossly intact throughout       Primary Care Physician   Eli Smith    Discharge Orders      Physical Therapy  Referral      Physical Therapy  Referral      Adult Neurology  Referral      Reason for your hospital stay    Active Problems:    Acute CVA (cerebrovascular accident) (H)     Activity    Your activity upon discharge: activity as tolerated     Follow Up    Follow up with neurology in 2 weeks.     Diet    Follow this diet upon discharge: Current Diet:Orders Placed This Encounter      Combination Diet 2 gm NA Diet; Thin Liquids (level 0)     Hospital Follow-up with Existing Primary Care Provider (PCP)            Significant Results and Procedures   Results for orders placed or performed during the hospital encounter of 05/16/25   CTA Head Neck with Contrast    Narrative     EXAM: CTA HEAD NECK W CONTRAST  LOCATION: [facility]  DATE: 5/16/2025    INDICATION: Left-sided weakness.    COMPARISON: None.    CONTRAST: 67 mL Isovue 370.    TECHNIQUE: Head and neck CT angiogram with intravenous contrast. Noncontrast head CT followed by axial helical CT images of the head and neck vessels obtained during the arterial phase of intravenous contrast administration. Axial 2D reconstructed images   and multiplanar 3D MIP reconstructed images of the head and neck vessels were performed by the technologist. Dose reduction techniques were used. All stenosis measurements made according to NASCET criteria unless otherwise specified.    FINDINGS:   NONCONTRAST HEAD CT:   INTRACRANIAL CONTENTS: No intracranial hemorrhage, extra-axial collection, or mass effect. No CT evidence of acute infarct. Prominent perivascular space versus chronic lacunar infarct in the inferior basal ganglia bilaterally, larger on the left.   Presumed chronic lacunar infarct in the right corona radiata extending to the posterior right lentiform nucleus. Mild burden scattered chronic small vessel ischemic change. Normal ventricles and sulci. Dural-based calcification along the superior right   frontal convexity.    VISUALIZED ORBITS/SINUSES/MASTOIDS: Prior bilateral cataract surgery. Visualized portions of the orbits are otherwise unremarkable. No paranasal sinus mucosal disease. No middle ear or mastoid effusion.    BONES/SOFT TISSUES: No acute abnormality.    HEAD CTA:  ANTERIOR CIRCULATION: Mild narrowing of the M1 segments bilaterally. Bilateral IAIN are patent. No aneurysm and no high flow vascular malformation. Standard [place] of [place] anatomy.    POSTERIOR CIRCULATION: Mild narrowing in the bilateral PCA with patent distal vertebral arteries and basilar artery. No aneurysm and no high flow vascular malformation. Dominant right and smaller left vertebral artery contribute to a normal basilar   artery.      DURAL VENOUS SINUSES: Not well evaluated on a technical basis.    NECK CTA:  RIGHT CAROTID: Mild narrowing at the right carotid bifurcation and proximal right internal carotid artery.    LEFT CAROTID: Mild narrowing at the left common carotid artery and left carotid bifurcation with a small ulcerative plaque.    VERTEBRAL ARTERIES: No focal stenosis or dissection. Dominant right and smaller left vertebral arteries.    AORTIC ARCH: Classic aortic arch anatomy with no significant stenosis at the origin of the great vessels.    NONVASCULAR STRUCTURES: Mild paraseptal emphysematous change at the visualized bilateral lung apices. Osteopenia with cervical spine degenerative change.      Impression    IMPRESSION:   NONCONTRAST HEAD CT:  1.  No acute intracranial abnormality.  2.  Chronic lacunar infarct in the right corona radiata extending to the posterior right lentiform nucleus.  3.  Prominent perivascular space versus chronic lacunar infarct in the inferior basal ganglia bilaterally.    HEAD CTA:  1.  Mild narrowing of the bilateral M1 segments and bilateral PCA.  2.  No aneurysm and no high flow vascular malformation.    NECK CTA:  1.  Mild narrowing at the left common carotid artery and left carotid bifurcation with a small ulcerative plaque at the left carotid bifurcation.  2.  Mild narrowing at the right carotid bifurcation and proximal right internal carotid artery.  3.  No high grade vertebral artery stenosis.    Head CT discussed with Dr. Hernan Holland by phone at 0939 hours on 5/16/2025. Head and neck CTA discussed with Dr. Hernan Holland by phone at 1004 hours on 5/16/2025.     MR Brain w/o & w Contrast    Narrative    EXAM: MR BRAIN W/O and W CONTRAST  LOCATION: [facility]  DATE: 5/16/2025    INDICATION: r weakness  dysphasia  COMPARISON: Chest CTA head and neck 5/16/2025  CONTRAST: 7 ml Gadavist  TECHNIQUE: Routine multiplanar multisequence head MRI without and with intravenous  contrast.    FINDINGS:  INTRACRANIAL CONTENTS: There is 13 x 6 mm focus of restricted diffusion at the posterior limb of left internal capsule consistent with acute lacunar infarct. There is additional punctate focus of acute lacunar infarct measuring 4 mm at the left   periatrial white matter. No mass, acute hemorrhage, or extra-axial fluid collections. Scattered nonspecific T2/FLAIR hyperintensities within the cerebral white matter most consistent with mild chronic microvascular ischemic change. There is chronic   lacunar infarct of the right corona radiata. There is prominent perivascular space at the inferior left basal ganglia. Mild generalized cerebral atrophy. No hydrocephalus. Normal position of the cerebellar tonsils. No pathologic contrast enhancement.   There is developmental venous anomaly of the left frontal lobe.    SELLA: No abnormality accounting for technique.    OSSEOUS STRUCTURES/SOFT TISSUES: Normal marrow signal. The major intracranial vascular flow voids are maintained.     ORBITS: Prior bilateral cataract surgery. Visualized portions of the orbits are otherwise unremarkable.     SINUSES/MASTOIDS: No paranasal sinus mucosal disease. No middle ear or mastoid effusion.       Impression    IMPRESSION:  Acute lacunar infarcts of the posterior limb left internal capsule and left periatrial white matter.     CT Head w/o Contrast    Narrative    EXAM: CT HEAD WITHOUT CONTRAST  LOCATION: [facility]  DATE: 05/17/2025    INDICATION: Post TNK, stroke.  COMPARISON: MRI of the brain dated 05/16/2025. CTA head and neck 05/16/2025.  TECHNIQUE: Routine CT Head without IV contrast. Multiplanar reformats. Dose reduction techniques were used.    FINDINGS:  INTRACRANIAL CONTENTS: The ventricles appear normal in size and configuration. Small chronic infarct in the right lentiform nucleus/corona radiata, unchanged. The small foci of restricted diffusion presumably representing recent  infarcts involving the   left lentiform nucleus/posterior limb of the internal capsule and periventricular region are not well-seen on this noncontrast CT exam. Unchanged dilated perivascular space in the left lentiform nucleus region. No new large transcortical acute or   subacute infarct. Minimal presumed chronic small vessel ischemic changes. Mild generalized brain parenchymal volume loss. No hyperdense acute intracranial hemorrhage, extra-axial fluid collection, or mass effect.    VISUALIZED ORBITS/SINUSES/MASTOIDS: Prior bilateral cataract surgery. Visualized portions of the orbits are otherwise unremarkable. Mild mucosal thickening in the ethmoid sinuses. No middle ear or mastoid effusion.    BONES/SOFT TISSUES: No acute abnormality.      Impression    IMPRESSION:  1.  No acute intracranial hemorrhage, extra-axial fluid collection, or mass effect.  2.  The small foci of restricted diffusion presumably representing recent infarcts involving the left lentiform nucleus/posterior limb of the internal capsule and periventricular region are not well-seen on this noncontrast CT exam.  3.  Unchanged small chronic infarct in the right lentiform nucleus/corona radiata.     CT Head w/o Contrast    Narrative    EXAM: CT HEAD WITHOUT CONTRAST  LOCATION: [facility]  DATE: 05/18/2025    INDICATION: Unconsciousness episode.  COMPARISON: None.  TECHNIQUE: Routine CT Head without IV contrast. Multiplanar reformats. Dose reduction techniques were used.    FINDINGS:  INTRACRANIAL CONTENTS: No intracranial hemorrhage, extra-axial collection, or mass effect.  No CT evidence of acute infarct. Mild presumed chronic small vessel ischemic changes. Normal ventricles and sulci.     VISUALIZED ORBITS/SINUSES/MASTOIDS: No intraorbital abnormality. No paranasal sinus mucosal disease. No middle ear or mastoid effusion.    BONES/SOFT TISSUES: No acute abnormality.      Impression    IMPRESSION:  1.  No acute  intracranial process.     Echocardiogram Complete - For age > 60 yrs     Value    LVEF  55-60%    Narrative    [id number]  [id number]  [id number]  [id number]^MARINA^ESTRADA^[place] [address]     Name: ASHLEIGH GUTIERREZ  MRN: [id number]  : 1955  Study Date: 2025 02:00 PM  Age: 70 yrs  Gender: Female  Patient Location: [facility]  Reason For Study: CVA  Ordering Physician: ESTRADA GRAY  Performed By: DIONICIO     BSA: 1.7 m2  Height: 63 in  Weight: 157 lb  HR: 62  BP: 140/75 mmHg  ______________________________________________________________________________  Procedure  Echocardiogram with two-dimensional, color and spectral Doppler. Definity (NDC  #16057-954) given intravenously. Technically difficult study. There is no  comparison study available.  ______________________________________________________________________________  Interpretation Summary     Technically difficult study.  The left ventricle is normal in size. There is normal left ventricular wall  thickness.  Left ventricular systolic function is normal. The visual ejection fraction is  55-60%. No regional wall motion abnormalities noted.  Grade I or early diastolic dysfunction.     The right ventricle is normal in size and function.  The left atrium is not well visualized. Right atrium not well visualized.  IVC diameter <2.1 cm collapsing >50% with sniff suggests a normal RA pressure  of 3 mmHg.  There is no comparison study available.  ______________________________________________________________________________  Left Ventricle  The left ventricle is normal in size. There is normal left ventricular wall  thickness. Left ventricular systolic function is normal. The visual ejection  fraction is 55-60%. Grade I or early diastolic dysfunction. No regional wall  motion abnormalities noted.     Right Ventricle  The right ventricle is normal in size and function.     Atria  The left atrium is not well visualized.  Right atrium not well visualized.     Mitral Valve  Mitral valve leaflets appear normal. There is trace mitral regurgitation.  There is no mitral valve stenosis.     Tricuspid Valve  The tricuspid valve is not well visualized. There is trace tricuspid  regurgitation. Right ventricular systolic pressure could not be approximated  due to inadequate tricuspid regurgitation.     Aortic Valve  The aortic valve is not well visualized. No aortic regurgitation is present.  No aortic stenosis is present.     Pulmonic Valve  The pulmonic valve is not well visualized. There is trace pulmonic valvular  regurgitation.     Vessels  The aorta root is normal. IVC diameter <2.1 cm collapsing >50% with sniff  suggests a normal RA pressure of 3 mmHg.     Pericardium  There is no pericardial effusion.     Rhythm  Sinus rhythm was noted.  ______________________________________________________________________________  MMode/2D Measurements & Calculations     IVSd: 0.83 cm  LVIDd: 3.6 cm  LVPWd: 0.71 cm  LV mass(C)d: 73.8 grams  LV mass(C)dI: 42.3 grams/m2  LVOT diam: 2.0 cm  LVOT area: 3.1 cm2  RV Base: 2.6 cm  RWT: 0.40  TAPSE: 1.9 cm     Doppler Measurements & Calculations  MV E max trenton: 56.1 cm/sec  MV A max trenton: 75.5 cm/sec  MV E/A: 0.74  MV max P.7 mmHg  MV mean P.0 mmHg  MV V2 VTI: 18.5 cm  MVA(VTI): 3.5 cm2  MV dec slope: 247.0 cm/sec2  MV dec time: 0.23 sec  Ao V2 max: 104.0 cm/sec  Ao max P.0 mmHg  Ao V2 mean: 71.4 cm/sec  Ao mean P.0 mmHg  Ao V2 VTI: 24.3 cm  DELORES(I,D): 2.7 cm2  DELORES(V,D): 2.9 cm2  LV V1 max PG: 3.8 mmHg  LV V1 max: 97.4 cm/sec  LV V1 VTI: 20.5 cm  SV(LVOT): 64.4 ml  SI(LVOT): 36.9 ml/m2  PA V2 max: 85.0 cm/sec  PA max P.9 mmHg  PA acc time: 0.11 sec     AV [place] Ratio (DI): 0.94  DELORES Index (cm2/m2): 1.5  E/E': 9.7  E/E' av.3  Lateral E/e': 7.1  Medial E/e': 9.6  Peak E' [place]: 5.8 cm/sec  RV S [place]: 8.9 cm/sec      ______________________________________________________________________________  Report approved by: Taiwo Huerta MD on 05/16/2025 03:37 PM             Discharge Medications   Current Discharge Medication List        START taking these medications    Details   aspirin (ASA) 81 MG EC tablet Take 1 tablet (81 mg) by mouth daily.  Qty: 20 tablet, Refills: 0    Associated Diagnoses: Acute CVA (cerebrovascular accident) (H)      atorvastatin (LIPITOR) 40 MG tablet Take 1 tablet (40 mg) by mouth every evening.  Qty: 30 tablet, Refills: 0    Associated Diagnoses: Acute CVA (cerebrovascular accident) (H)      clopidogrel (PLAVIX) 75 MG tablet Take 1 tablet (75 mg) by mouth daily.  Qty: 20 tablet, Refills: 0    Associated Diagnoses: Acute CVA (cerebrovascular accident) (H)      nicotine (COMMIT) 2 MG lozenge Place 1 lozenge (2 mg) inside cheek every hour as needed for nicotine withdrawal symptoms.  Qty: 60 lozenge, Refills: 0    Associated Diagnoses: Acute CVA (cerebrovascular accident) (H)      nicotine (NICODERM CQ) 7 MG/24HR 24 hr patch Place 1 patch over 24 hours onto the skin every 24 hours.  Qty: 30 patch, Refills: 0    Associated Diagnoses: Acute CVA (cerebrovascular accident) (H)           CONTINUE these medications which have NOT CHANGED    Details   acetaminophen (TYLENOL) 500 MG tablet Take 500-1,000 mg by mouth daily.      albuterol (PROAIR HFA/PROVENTIL HFA/VENTOLIN HFA) 108 (90 Base) MCG/ACT inhaler Inhale 1-2 puffs into the lungs every 4 hours as needed for shortness of breath.      fluticasone (FLONASE) 50 MCG/ACT nasal spray Spray 2 sprays into both nostrils daily as needed for allergies (or a cold).      gabapentin (NEURONTIN) 300 MG capsule Take 300 mg by mouth every evening.      lisinopril (ZESTRIL) 10 MG tablet Take 1 tablet (10 mg) by mouth daily.  Qty: 90 tablet, Refills: 1    Associated Diagnoses: Essential hypertension      Melatonin 10 MG TABS tablet Take 10 mg by mouth at bedtime.      SYMBICORT  80-4.5 MCG/ACT Inhaler Inhale 2 puffs into the lungs daily.      Vitamin D3 (VITAMIN D, CHOLECALCIFEROL,) 25 mcg (1000 units) tablet Take 1 tablet by mouth daily (with lunch).           Allergies   No Known Allergies

## 2025-05-19 NOTE — PLAN OF CARE
Stroke Education Note    The following information was reviewed with patient:    - Activation of emergency medical system (when to call 911)    - Individualized risk factors for stroke:      high blood pressure     high cholesterol     smoking/tobacco use     diet     physical inactivity    - Warning signs and symptoms of stroke:   B = Balance loss   E = Eyesight changes   F = Facial droop or numbness   A = Arm or leg weakness   S = Speech difficulty, slurred speech   T = Time to call 911 for help    Written stroke educational materials given:   - Learning about BE FAST: Stroke Warning Signs and Learning about Risk Factors for Stroke (Healthwise)   - Understanding Stroke: Key Resources After a Stroke (FOD #966989)    Learner's response to education:     desire to change     Amanda Hopkins RN

## 2025-05-19 NOTE — PLAN OF CARE
Problem: Stroke, Ischemic (Includes Transient Ischemic Attack)  Goal: Optimal Cognitive Function  Outcome: Progressing  Intervention: Optimize Cognitive Function  Recent Flowsheet Documentation  Taken 5/19/2025 0002 by Manas Jacques, RN  Sensory Stimulation Regulation:   care clustered   lighting decreased   quiet environment promoted  Reorientation Measures:   clock in view   calendar in view  Goal: Optimal Functional Ability  Outcome: Progressing  Intervention: Optimize Functional Ability  Recent Flowsheet Documentation  Taken 5/19/2025 0403 by Manas Jacques, RN  Activity Management:   ambulated to bathroom   back to bed  Goal: Improved Sensorimotor Function  Outcome: Progressing  Intervention: Optimize Range of Motion, Motor Control and Function  Recent Flowsheet Documentation  Taken 5/19/2025 0002 by Manas Jacques, RN  Range of Motion: active ROM (range of motion) encouraged   Goal Outcome Evaluation:       Alert and oriented x4. Up with standby assist to the bathroom. Pt denies pain. Neuros intact. No bleeding episodes noted. SR with prolonged QT on telemetry. VSS, on room air. Bed alarm on for safety. Anticipating discharge today.

## 2025-05-19 NOTE — PLAN OF CARE
Speech Language Pathology: Orders received. Patient was seen for dysphagia management and noted possible dysarthria. Chart further reviewed and discussed with care team.? No ongoing episodes of dysarthria have been noted. Patient denies any changes or concerns with speech and speech is clear and precise, improved from dysphagia sessions. Patient reports possible increased time needed to complete word games on her phone but communicates wants/needs WFL. Further SL Evaluation is not warranted at this time. Educated patient on reporting symptoms and concerns to MD and ST available if needs arise.  Anticipate discharge today. Will complete orders.

## 2025-05-19 NOTE — PROGRESS NOTES
Brief Neurology Note:    Chart reviewed. No concerns overnight.   Ok to discharge from Neurology standpoint.   DAPT plan in place.   Gerri should follow up in Neurology clinic in 2-3 months.     Celia Rogers MD

## 2025-05-20 ENCOUNTER — PATIENT OUTREACH (OUTPATIENT)
Dept: CARE COORDINATION | Facility: CLINIC | Age: 70
End: 2025-05-20
Payer: COMMERCIAL

## 2025-05-20 ENCOUNTER — DOCUMENTATION ONLY (OUTPATIENT)
Dept: OTHER | Facility: CLINIC | Age: 70
End: 2025-05-20
Payer: COMMERCIAL

## 2025-05-20 NOTE — LETTER
[facility] CARE COORDINATION  [facility]    May 20, 2025    Gerri Stallings  [address] [place] [address]      Dear Gerri,    I am a clinic care coordinator who works with Eli Smith MD with the [facility]. I wanted to thank you for spending the time to talk with me.  Below is a description of clinic care coordination and how I can further assist you.       The clinic care coordination team is made up of a registered nurse, , financial resource worker and community health worker who understand the health care system. The goal of clinic care coordination is to help you manage your health and improve access to the health care system. Our team works alongside your provider to assist you in determining your health and social needs. We can help you obtain health care and community resources, providing you with necessary information and education. We can work with you through any barriers and develop a care plan that helps coordinate and strengthen the communication between you and your care team.  Our services are voluntary and are offered without charge to you personally.    Please feel free to contact me with any questions or concerns regarding care coordination and what we can offer.      We are focused on providing you with the highest-quality healthcare experience possible.    Sincerely,     Ivette Uribe,   [facility]  [phone]

## 2025-05-20 NOTE — PROGRESS NOTES
Assessment/Plan:    Acute CVA (cerebrovascular accident) (H)  Hospital follow-up today given recent stroke. Reviewed medications - will continue aspirin + plavix for 3 weeks and then transition to aspirin monotherapy. She is taking statin. She is working on smoking cessation. We are working on BP control as below. She has appointments scheduled with PT and neurology in the next couple of weeks.   - aspirin (ASA) 81 MG EC tablet  Dispense: 90 tablet; Refill: 3  - atorvastatin (LIPITOR) 40 MG tablet  Dispense: 90 tablet; Refill: 3    Essential hypertension  BP uncontrolled today, will increase lisinopril to 20mg daily. Pt has home BP cuff to do monitoring - goal BP <140/90.  - lisinopril (ZESTRIL) 20 MG tablet  Dispense: 90 tablet; Refill: 1       Follow up: in 2-3 months for JAELYN Smith MD  [facility]    Subjective:   Gerri Stallings is a 70 year old female is here today for hospital follow-up         5/20/2025   Post Discharge Outreach   How are you doing now that you are home? well   How are your symptoms? (Red Flag symptoms escalate to triage hotline per guidelines) Improved   Does the patient have their discharge instructions?  Yes   Does the patient have questions regarding their discharge instructions?  No   Were you started on any new medications or were there changes to any of your previous medications?  Yes   Does the patient have all of their medications? Yes   Do you have questions regarding any of your medications?  Yes (see comment)   Do you have all of your needed medical supplies or equipment (DME)?  (i.e. oxygen tank, CPAP, cane, etc.) No - What equipment or supplies are needed?   Discharge Follow Up Appointment Date 5/22/2025   Discharge Follow Up Appointment Scheduled with? Primary Care Provider       Hospital Follow-up Visit:    Hospital/Nursing Home/IP Rehab Facility: [facility]  Most Recent Admission Date: 5/16/2025   Most Recent Admission  Diagnosis: Stroke-like symptoms - R29.90  Acute CVA (cerebrovascular accident) (H) - I63.9     Most Recent Discharge Date: 5/19/2025   Most Recent Discharge Diagnosis: Stroke-like symptoms - R29.90  Acute CVA (cerebrovascular accident) (H) - I63.9  Other specified counseling - Z71.89   Was the patient in the ICU or did the patient experience delirium during hospitalization?  No  Do you have any other stressors you would like to discuss with your provider? No    Problems taking medications regularly:  None  Medication changes since discharge: None  Problems adhering to non-medication therapy:  None    Summary of hospitalization:  [facility] discharge summary reviewed  Diagnostic Tests/Treatments reviewed.  Follow up needed: none  Other Healthcare Providers Involved in Patient s Care:         neurology  Update since discharge: improved.  Feeling stressed/worried about doing something to cause another stroke/issue. Otherwise feeling ok - no fainting/dizziness, headaches, vision issues, chest pain, SOB, leg swelling. Normal gait. Taking medications. Working on smoking cessation, has smoked a few times but otherwise doing patches to quit.     Going back to work on Monday - got a note from the hospital doctor - wants to keep working from home, is calmer at home she feels like    Plan of care communicated with patient      Patient Active Problem List   Diagnosis    Hypertension    Lower Back Pain    Lumbar Radiculopathy    Tobacco use disorder    Metatarsalgia of left foot    Hammertoe of left foot    Hallux valgus with bunions of left foot    Cortical age-related cataract of left eye    Sesamoiditis of right foot    Capsulitis of foot, right    Acute CVA (cerebrovascular accident) (H)     History reviewed. No pertinent past medical history.  Past Surgical History:   Procedure Laterality Date    BACK SURGERY      BUNIONECTOMY Bilateral     CHOLECYSTECTOMY      RELEASE CARPAL TUNNEL Bilateral     VAGOTOMY        Current Outpatient Medications   Medication Sig Dispense Refill    acetaminophen (TYLENOL) 500 MG tablet Take 500-1,000 mg by mouth daily.      albuterol (PROAIR HFA/PROVENTIL HFA/VENTOLIN HFA) 108 (90 Base) MCG/ACT inhaler Inhale 1-2 puffs into the lungs every 4 hours as needed for shortness of breath.      aspirin (ASA) 81 MG EC tablet Take 1 tablet (81 mg) by mouth daily. 90 tablet 3    atorvastatin (LIPITOR) 40 MG tablet Take 1 tablet (40 mg) by mouth every evening. 90 tablet 3    clopidogrel (PLAVIX) 75 MG tablet Take 1 tablet (75 mg) by mouth daily. 20 tablet 0    fluticasone (FLONASE) 50 MCG/ACT nasal spray Spray 2 sprays into both nostrils daily as needed for allergies (or a cold).      gabapentin (NEURONTIN) 300 MG capsule Take 300 mg by mouth every evening.      lisinopril (ZESTRIL) 20 MG tablet Take 1 tablet (20 mg) by mouth daily. 90 tablet 1    Melatonin 10 MG TABS tablet Take 10 mg by mouth at bedtime.      nicotine (COMMIT) 2 MG lozenge Place 1 lozenge (2 mg) inside cheek every hour as needed for nicotine withdrawal symptoms. 60 lozenge 0    SYMBICORT 80-4.5 MCG/ACT Inhaler Inhale 2 puffs into the lungs daily.      nicotine (NICODERM CQ) 7 MG/24HR 24 hr patch Place 1 patch over 24 hours onto the skin every 24 hours. (Patient not taking: Reported on 5/22/2025) 30 patch 0    Vitamin D3 (VITAMIN D, CHOLECALCIFEROL,) 25 mcg (1000 units) tablet Take 1 tablet by mouth daily (with lunch). (Patient not taking: Reported on 5/22/2025)       No current facility-administered medications for this visit.     No Known Allergies  Social History     Socioeconomic History    Marital status:      Spouse name: Not on file    Number of children: Not on file    Years of education: Not on file    Highest education level: Not on file   Occupational History    Not on file   Tobacco Use    Smoking status: Every Day     Current packs/day: 0.25     Average packs/day: 0.9 packs/day for 51.4 years (46.6 ttl pk-yrs)      Types: Cigarettes     Start date: 1974    Smokeless tobacco: Never    Tobacco comments:     cutting back 1/25/2016   Substance and Sexual Activity    Alcohol use: Yes     Alcohol/week: 4.0 standard drinks of alcohol    Drug use: No    Sexual activity: Not on file   Other Topics Concern    Not on file   Social History Narrative    Not on file     Social Drivers of Health     Financial Resource Strain: Low Risk  (5/16/2025)    Financial Resource Strain     Within the past 12 months, have you or your family members you live with been unable to get utilities (heat, electricity) when it was really needed?: No   Food Insecurity: Low Risk  (5/16/2025)    Food Insecurity     Within the past 12 months, did you worry that your food would run out before you got money to buy more?: No     Within the past 12 months, did the food you bought just not last and you didn t have money to get more?: No   Transportation Needs: Low Risk  (5/16/2025)    Transportation Needs     Within the past 12 months, has lack of transportation kept you from medical appointments, getting your medicines, non-medical meetings or appointments, work, or from getting things that you need?: No   Physical Activity: Not on file   Stress: Not on file   Social Connections: Not on file   Interpersonal Safety: Low Risk  (5/16/2025)    Interpersonal Safety     Do you feel physically and emotionally safe where you currently live?: Yes     Within the past 12 months, have you been hit, slapped, kicked or otherwise physically hurt by someone?: No     Within the past 12 months, have you been humiliated or emotionally abused in other ways by your partner or ex-partner?: No   Housing Stability: Low Risk  (5/16/2025)    Housing Stability     Do you have housing? : Yes     Are you worried about losing your housing?: No     Family History   Problem Relation Age of Onset    Cerebrovascular Disease Mother     Hypertension Mother     Diverticulitis Mother     Glaucoma Mother   "   Obesity Mother     Pancreatic Cancer Father         metastatic    Hypertension Sister     Obesity Sister     Diabetes Brother     Obesity Brother      Review of systems is as stated in HPI, and the remainder of system review is otherwise negative.    Objective:     BP (!) 161/79   Pulse 77   Temp 97.6  F (36.4  C) (Temporal)   Resp 16   Ht 1.626 m (5' 4\")   Wt 71.7 kg (158 lb)   LMP  (LMP Unknown)   SpO2 96%   BMI 27.12 kg/m      General appearance: awake, NAD  HEENT: atraumatic, normocephalic, no scleral icterus or injection, ears and nose grossly normal, moist mucous membranes  CV: RRR, no murmurs/rubs/gallops, normal S1 and S2  Lungs: CTAB, no wheezes or crackles, breathing comfortably on room air  Extremities: no LE edema bilaterally, moving all extremities  Skin: no rashes or lesions - bruises from IV/blood draws  Neuro: alert, oriented x3, CNs grossly intact, no focal deficits appreciated  Psych: normal mood/affect/behavior, answering questions appropriately, linear thought process    "

## 2025-05-20 NOTE — PROGRESS NOTES
Clinic Care Coordination Contact  Transitions of Care Outreach  Chief Complaint   Patient presents with    Clinic Care Coordination - Initial    Clinic Care Coordination - Post Hospital       Most Recent Admission Date: 5/16/2025   Most Recent Admission Diagnosis: Stroke-like symptoms - R29.90  Acute CVA (cerebrovascular accident) (H) - I63.9     Most Recent Discharge Date: 5/19/2025   Most Recent Discharge Diagnosis: Stroke-like symptoms - R29.90  Acute CVA (cerebrovascular accident) (H) - I63.9  Other specified counseling - Z71.89     Transitions of Care Assessment    Discharge Assessment  How are you doing now that you are home?: well  How are your symptoms? (Red Flag symptoms escalate to triage hotline per guidelines): Improved  Do you know how to contact your clinic care team if you have future questions or changes to your health status? : Yes  Does the patient have their discharge instructions? : Yes  Does the patient have questions regarding their discharge instructions? : No  Were you started on any new medications or were there changes to any of your previous medications? : Yes  Does the patient have all of their medications?: Yes  Do you have questions regarding any of your medications? : Yes (see comment) (reviewed her new medicaitons.  Discussed MTM referral.)  Do you have all of your needed medical supplies or equipment (DME)?  (i.e. oxygen tank, CPAP, cane, etc.): No - What equipment or supplies are needed?         Post-op (Clinicians Only)  Did the patient have surgery or a procedure: No  Fever: No  Chills: No  Eating & Drinking: eating and drinking without complaints/concerns  PO Intake: regular diet  Bowel Function: normal  Urinary Status: voiding without complaint/concerns  Was not able to set up a neurology appt near her until end of year, so she did not schedule.  Discussed perhaps doing a virtual visit instead of being seen in clinic. She expects the schedulers are working on this.  Explained  why she will be seeing neurology due to her stroke. She has appt with PCP and will ask about a letter so she can work from home instead of going into her office two days per week.  She has the nicotine replacement meds and took only 3 puffs yesterday and none thus far today and hopes to stop smoking. She will consider if she wants to set up therapies and will wait for them to call her.  Reviewed MTM support if desired. Needs no help at home at this time. Reviewed care coordination and she will call if she needs support.      Follow up Plan     Discharge Follow-Up  Discharge follow up appointment scheduled in alignment with recommended follow up timeframe or Transitions of Risk Category? (Low = within 30 days; Moderate= within 14 days; High= within 7 days): Yes  Discharge Follow Up Appointment Date: 05/22/25  Discharge Follow Up Appointment Scheduled with?: Primary Care Provider    Future Appointments   Date Time Provider Department Center   5/22/2025 10:00 AM Eli Smith MD [facility]       Outpatient Plan as outlined on AVS reviewed with patient.    For any urgent concerns, please contact our 24 hour nurse triage line: [phone] ([phone])       TERRY Johns

## 2025-05-20 NOTE — Clinical Note
Will discuss with you a work letter allowing her to work from home versus going into office two days per week.  Thanks, Ivette

## 2025-05-22 ENCOUNTER — OFFICE VISIT (OUTPATIENT)
Dept: FAMILY MEDICINE | Facility: CLINIC | Age: 70
End: 2025-05-22
Payer: COMMERCIAL

## 2025-05-22 VITALS
HEIGHT: 64 IN | OXYGEN SATURATION: 96 % | SYSTOLIC BLOOD PRESSURE: 161 MMHG | WEIGHT: 158 LBS | TEMPERATURE: 97.6 F | RESPIRATION RATE: 16 BRPM | BODY MASS INDEX: 26.98 KG/M2 | HEART RATE: 77 BPM | DIASTOLIC BLOOD PRESSURE: 79 MMHG

## 2025-05-22 DIAGNOSIS — I63.9 ACUTE CVA (CEREBROVASCULAR ACCIDENT) (H): ICD-10-CM

## 2025-05-22 DIAGNOSIS — I10 ESSENTIAL HYPERTENSION: ICD-10-CM

## 2025-05-22 RX ORDER — LISINOPRIL 20 MG/1
20 TABLET ORAL DAILY
Qty: 90 TABLET | Refills: 1 | Status: SHIPPED | OUTPATIENT
Start: 2025-05-22

## 2025-05-22 RX ORDER — ASPIRIN 81 MG/1
81 TABLET, COATED ORAL DAILY
Qty: 90 TABLET | Refills: 3 | Status: SHIPPED | OUTPATIENT
Start: 2025-05-22

## 2025-05-22 RX ORDER — ATORVASTATIN CALCIUM 40 MG/1
40 TABLET, FILM COATED ORAL EVERY EVENING
Qty: 90 TABLET | Refills: 3 | Status: SHIPPED | OUTPATIENT
Start: 2025-05-22

## 2025-05-22 NOTE — LETTER
May 22, 2025      Gerri Stallings  [address] [place] [address]        To Whom It May Concern:    Gerri Stallings was seen on 5/22/25 following her recent hospitalization from 5/16-5/19/25. Given recent health concerns/conditions, I recommend she work from home exclusively for the next 3 months. At that time we will reassess if this is still needed and provide a new note if needed.        Sincerely,        Eli Smith MD

## 2025-05-30 ENCOUNTER — PATIENT OUTREACH (OUTPATIENT)
Dept: NEUROLOGY | Facility: CLINIC | Age: 70
End: 2025-05-30
Payer: COMMERCIAL

## 2025-05-30 NOTE — PROGRESS NOTES
Stroke RN Care Coordination - Initial Outreach Note     SITUATION     Gerri Stallings is a 70 year old female who is receiving support for:  Clinic Care Coordination - Initial (Stroke RN Outreach)    BACKGROUND     Patient admitted to [facility] 5/16-5/19 for stroke s/p TNK.    ASSESSMENT     Spoke with pt and she reports that she's doing well. No residual stroke deficit that she can recall. She is compliant on her medications and is continuing to work on smoking cessation. Not checking BP regularly at home. Recommended she put BP monitor in a more common space that she passes by daily as a better reminder. Confirmed upcoming appts w/ PT and neurology. She did not have any other stroke related questions or concerns.     PLAN     Follow-up plan:  Patient does not feel she needs ongoing stroke care management support. Will send intro letter with contact information. No additional outreaches will be made.      Celia VARGAS, RN, SCRN  RN Stroke Neurology Care Coordinator  [facility] Neuroscience Service Line

## 2025-06-03 ENCOUNTER — THERAPY VISIT (OUTPATIENT)
Dept: PHYSICAL THERAPY | Facility: REHABILITATION | Age: 70
End: 2025-06-03
Attending: STUDENT IN AN ORGANIZED HEALTH CARE EDUCATION/TRAINING PROGRAM
Payer: COMMERCIAL

## 2025-06-03 DIAGNOSIS — I63.9 ACUTE CVA (CEREBROVASCULAR ACCIDENT) (H): ICD-10-CM

## 2025-06-03 PROCEDURE — 97110 THERAPEUTIC EXERCISES: CPT | Mod: GP

## 2025-06-03 PROCEDURE — 97161 PT EVAL LOW COMPLEX 20 MIN: CPT | Mod: GP

## 2025-06-03 NOTE — PROGRESS NOTES
PHYSICAL THERAPY EVALUATION  Type of Visit: Evaluation       Fall Risk Screen:  Have you fallen 2 or more times in the past year?: No  Have you fallen and had an injury in the past year?: No    Subjective         Presenting condition or subjective complaint: check up after strok  Date of onset: 05/19/25    Relevant medical history:     Dates & types of surgery: foot surgeries    Prior diagnostic imaging/testing results: MRI; CT scan     Prior therapy history for the same diagnosis, illness or injury: No      Prior Level of Function  IND    Living Environment  Social support: Alone   Type of home: Apartment/condo   Stairs to enter the home: No       Ramp: No   Stairs inside the home: No       Help at home: None  Equipment owned:       Employment: Yes sr   Hobbies/Interests: bowling,crafts,dancing    Patient goals for therapy: not sure    Pain assessment: Pain present     Objective      OBSERVATION: IND  RANGE OF MOTION: LE ROM WNL  UE ROM WNL  STRENGTH: LE Strength WNL  UE Strength WNL    BED MOBILITY: WNL  TRANSFERS: WNL  GAIT:   Level of Ferriday: WNL  Assistive Device(s): None  Gait Deviations: WNL  Gait Distance: WNL  Stairs: WNL     BALANCE: WNL    SPECIAL TESTS  Functional Gait Assessment (FGA) TOTAL SCORE: (MAXIMUM SCORE 30): 30 30/30   10 Meter Walk Test (Comfortable)  5.6 sec   10 Meter Walk Test (Fast)  3.9 sec   6 Minute Walk Test (6MWT)           Pearl Balance Scale (BBS)     5 Times Sit-to-Stand (5TSTS)  10 sec     Dynamic Gait Index (DGI)     Timed Up and Go (TUG) - sec    Single Leg Stance Right (sec) 10 sec    Single Leg Stance Left (sec) 10 sec    Modified CTSIB Conditions (sec) Cond 1: 30 sec   Cond 2: 30 sec  Cond 4: 30 sec  Cond 5 : 30 sec   Romberg  (sec) 30 sec    Sharpened Romberg (sec) 30 sec                    SENSATION: LE Sensation WNL      Assessment & Plan   CLINICAL IMPRESSIONS  Medical Diagnosis: I63.9 (ICD-10-CM) - Acute CVA (cerebrovascular accident) (H)    Treatment  Diagnosis: Balance impairment, generalized weakness, force production deficit   Impression/Assessment: Patient is a 70 year old female with balance and mobility post CVA complaints.  The following significant findings have been identified: Pain, Decreased ROM/flexibility, Decreased joint mobility, Decreased strength, Impaired balance, Decreased proprioception, Impaired sensation, Impaired gait, Impaired muscle performance, Decreased activity tolerance, Impaired posture, and Instability. These impairments interfere with their ability to perform self care tasks, work tasks, recreational activities, household chores, driving , household mobility, and community mobility as compared to previous level of function. Pt present to PT s/p CVA that occured 5/19/25. PLOF pt IND with all cares and mobility. Post discharge home pt has returned to full independence and returned to work. At this time pt not needing PT services, no concerns or deficits identified and is approprite to DC today after eval.     Clinical Decision Making (Complexity):  Clinical Presentation: Stable/Uncomplicated  Clinical Presentation Rationale: based on medical and personal factors listed in PT evaluation  Clinical Decision Making (Complexity): Low complexity    PLAN OF CARE  Treatment Interventions:  Interventions: Gait Training, Manual Therapy, Neuromuscular Re-education, Therapeutic Activity, Therapeutic Exercise, Self-Care/Home Management    Long Term Goals     PT Goal 1  Goal Identifier: HEP  Goal Description: Patient will be independent in self-management of condition and HEP to reach functional goals.  Goal Progress: met  Target Date: 06/03/25  Date Met: 06/03/25  PT Goal 2  Goal Identifier: FGA  Goal Description: Pt will demonstrate a 4 point improvement on the FGA to demonstrate minimum clinically significant difference in score to demonstrate improved safety with functional mobility and decrease risk of falls.  Rationale: to maximize safety  and independence with performance of ADLs and functional tasks;to maximize safety and independence with self cares  Target Date: 08/26/25  PT Goal 3  Goal Identifier: 6 MWT  Goal Description: Pt will increase distance ambulated on 6MWT with LRAD by 120 feet to demonstrate improved endurance with ambulation.  Rationale: to maximize safety and independence with performance of ADLs and functional tasks;to maximize safety and independence with self cares  Target Date: 06/03/25  Date Met: 06/03/25  PT Goal 4  Goal Identifier: 5 x STS  Goal Description: Pt will be able to perform 5xSTS in 12 seconds or less to demonstrate appropriate LE strength for community dwelling adults.  Goal Progress: met  Target Date: 06/03/25  Date Met: 06/03/25      Frequency of Treatment: 1x/wk  Duration of Treatment: 12 weeks    Recommended Referrals to Other Professionals:   Education Assessment:   Learner/Method: Patient  Education Comments: Verbalizes understanding    Risks and benefits of evaluation/treatment have been explained.   Patient/Family/caregiver agrees with Plan of Care.     Evaluation Time:     PT Eval, Low Complexity Minutes (08091): 25       Signing Clinician: AUDREY ISABEL, PT

## 2025-06-03 NOTE — PROGRESS NOTES
DISCHARGE  Reason for Discharge: Patient has met all goals.  No PT services needed    Equipment Issued: none    Discharge Plan: Patient to continue home program.    Referring Provider:  Evelio Graham     06/03/25 0500   Appointment Info   Signing clinician's name / credentials Inge Todd, PT, DPT   Total/Authorized Visits 12   Visits Used 1   Medical Diagnosis I63.9 (ICD-10-CM) - Acute CVA (cerebrovascular accident) (H)   PT Tx Diagnosis Balance impairment, generalized weakness, force production deficit   Quick Adds Certification   Progress Note/Certification   Start of Care Date 06/03/25   Onset of illness/injury or Date of Surgery 05/19/25   Therapy Frequency 1x/wk   Predicted Duration 12 weeks   Certification date from 06/03/25   Certification date to 06/03/25   Progress Note Due Date   (visit 10)   GOALS   PT Goals 2;3;4   PT Goal 1   Goal Identifier HEP   Goal Description Patient will be independent in self-management of condition and HEP to reach functional goals.   Goal Progress met   Target Date 06/03/25   Date Met 06/03/25   PT Goal 2   Goal Identifier FGA   Goal Description Pt will demonstrate a 4 point improvement on the FGA to demonstrate minimum clinically significant difference in score to demonstrate improved safety with functional mobility and decrease risk of falls.   Rationale to maximize safety and independence with performance of ADLs and functional tasks;to maximize safety and independence with self cares   Target Date 08/26/25   PT Goal 3   Goal Identifier 6 MWT   Goal Description Pt will increase distance ambulated on 6MWT with LRAD by 120 feet to demonstrate improved endurance with ambulation.   Rationale to maximize safety and independence with performance of ADLs and functional tasks;to maximize safety and independence with self cares   Target Date 06/03/25   Date Met 06/03/25   PT Goal 4   Goal Identifier 5 x STS   Goal Description Pt will be able to perform 5xSTS in 12 seconds or less  to demonstrate appropriate LE strength for community dwelling adults.   Goal Progress met   Target Date 06/03/25   Date Met 06/03/25   Subjective Report   Subjective Report Pt presents to PT after a stroke that occured 5/19/25, had a hospital stay for 3 nights, 2 nights in ICU. Was discharged home after. IND with all activities prior to stroke, still IND currently. Pt has returned back to work last week, has not noticed any limitations post stroke. Pt reports no deficits.   Objective Measures   Objective Measures Objective Measure 1;Objective Measure 2;Objective Measure 3   Objective Measure 2   Objective Measure 5 x STS   Details (eval) 10 sec   Objective Measure 3   Objective Measure FGA   Details (eval) 30/30   Treatment Interventions (PT)   Interventions Therapeutic Procedure/Exercise   Therapeutic Procedure/Exercise   Therapeutic Procedures: strength, endurance, ROM, flexibility minutes (36281) 8   Ther Proc 1 - Details INstructed in HEP - calf stretches for RLE tightness calf and solues 30'' each B. Added to phone and printed. Discussed with pt if noticing any deficits and concerns to reach out to PCP and have referal for OT/PT where appropriate   Skilled Intervention Initiated HEP, Patient education, Verbal and tactile cues utilized to facilitate correct exercise technique   Patient Response/Progress Tolerated well, verbalizes understanding   Eval/Assessments   PT Eval, Low Complexity Minutes (09372) 25   Education   Learner/Method Patient   Education Comments Verbalizes understanding   Plan   Home program PTRx (phone) - calf stretches   Plan for next session DC   Comments   Comments Pt present to PT s/p CVA that occured 5/19/25. PLOF pt IND with all cares and mobility. Post discharge home pt has returned to full independence and returned to work. At this time pt not needing PT services, no concerns or deficits identified and is approprite to DC today after eval.   Total Session Time   Timed Code Treatment  Minutes 8   Total Treatment Time (sum of timed and untimed services) 33     AUDREY ISABLE, PT

## 2025-06-09 ENCOUNTER — OFFICE VISIT (OUTPATIENT)
Dept: NEUROLOGY | Facility: CLINIC | Age: 70
End: 2025-06-09
Attending: STUDENT IN AN ORGANIZED HEALTH CARE EDUCATION/TRAINING PROGRAM
Payer: COMMERCIAL

## 2025-06-09 VITALS
HEART RATE: 68 BPM | SYSTOLIC BLOOD PRESSURE: 154 MMHG | BODY MASS INDEX: 26.43 KG/M2 | WEIGHT: 154 LBS | DIASTOLIC BLOOD PRESSURE: 97 MMHG

## 2025-06-09 DIAGNOSIS — I10 ESSENTIAL HYPERTENSION: ICD-10-CM

## 2025-06-09 DIAGNOSIS — I63.9 ACUTE CVA (CEREBROVASCULAR ACCIDENT) (H): ICD-10-CM

## 2025-06-09 DIAGNOSIS — E78.5 HYPERLIPIDEMIA LDL GOAL <70: Primary | ICD-10-CM

## 2025-06-09 PROCEDURE — 3080F DIAST BP >= 90 MM HG: CPT | Performed by: PSYCHIATRY & NEUROLOGY

## 2025-06-09 PROCEDURE — 99205 OFFICE O/P NEW HI 60 MIN: CPT | Performed by: PSYCHIATRY & NEUROLOGY

## 2025-06-09 PROCEDURE — 3077F SYST BP >= 140 MM HG: CPT | Performed by: PSYCHIATRY & NEUROLOGY

## 2025-06-09 PROCEDURE — G2211 COMPLEX E/M VISIT ADD ON: HCPCS | Performed by: PSYCHIATRY & NEUROLOGY

## 2025-06-09 RX ORDER — LISINOPRIL 30 MG/1
30 TABLET ORAL DAILY
Qty: 90 TABLET | Refills: 1 | Status: SHIPPED | OUTPATIENT
Start: 2025-06-09

## 2025-06-09 NOTE — LETTER
6/9/2025      Gerri Stallings  [address] [facility] [facility]  [place] [address]      Dear Colleague,    Thank you for referring your patient, Gerri Stallings, to the [facility] [place]. Please see a copy of my visit note below.    NEUROLOGY OUTPATIENT CONSULT NOTE   Jun 9, 2025     CHIEF COMPLAINT/REASON FOR VISIT/REASON FOR CONSULT  Patient presents with:  Stroke: Patient has been doing well.     REASON FOR CONSULTATION- Stroke    REFERRAL SOURCE  Dr. Evelio Graham  CC Dr. Evelio Graham    HISTORY OF PRESENT ILLNESS  Gerri Stallings is a 70 year old female seen today for evaluation of stroke.  She has a history of smoking.  She presented to the emergency room in May 2025 with right-sided weakness and slurred speech.  Patient was having some vision issues in the right eye as well.  She was given tenecteplase in the hospital.  MRI brain did show posterior limb of the left internal capsule and left parietal white matter.  Denies any chest pains palpitations or headaches.    Since discharge she had been working with PT and OT and has made significant improvement with no ongoing symptoms.  Has been able to cut down on smoking but has not completely stopped.  Trying to stay active.  Is eating healthy.  She is currently on aspirin and Lipitor and tolerating those well.  She is also on lisinopril.  Blood pressure has been running in the 150s range.  She has stopped the Plavix that she was initially put on in the hospital.  No new symptoms.    Previous history is reviewed and this is unchanged.    PAST MEDICAL/SURGICAL HISTORY  No past medical history on file.  Patient Active Problem List   Diagnosis     Hypertension     Lower Back Pain     Lumbar Radiculopathy     Tobacco use disorder     Metatarsalgia of left foot     Hammertoe of left foot     Hallux valgus with bunions of left foot     Cortical age-related cataract of left eye     Sesamoiditis of right foot     Capsulitis of foot, right      Acute CVA (cerebrovascular accident) (H)   Positive for stroke and high blood pressure    FAMILY HISTORY  Family History   Problem Relation Age of Onset     Cerebrovascular Disease Mother      Hypertension Mother      Diverticulitis Mother      Glaucoma Mother      Obesity Mother      Pancreatic Cancer Father         metastatic     Hypertension Sister      Obesity Sister      Diabetes Brother      Obesity Brother    Positive for stroke and high blood pressure.    SOCIAL HISTORY  Social History     Tobacco Use     Smoking status: Every Day     Current packs/day: 0.25     Average packs/day: 0.9 packs/day for 51.4 years (46.6 ttl pk-yrs)     Types: Cigarettes     Start date: 1974     Smokeless tobacco: Never     Tobacco comments:     cutting back 1/25/2016   Substance Use Topics     Alcohol use: Yes     Alcohol/week: 4.0 standard drinks of alcohol     Drug use: No       SYSTEMS REVIEW  Twelve-system ROS was done and other than the HPI this was negative.  Pertinent positives noted in the HPI.    MEDICATIONS  Current Outpatient Medications   Medication Sig Dispense Refill     acetaminophen (TYLENOL) 500 MG tablet Take 500-1,000 mg by mouth daily.       albuterol (PROAIR HFA/PROVENTIL HFA/VENTOLIN HFA) 108 (90 Base) MCG/ACT inhaler Inhale 1-2 puffs into the lungs every 4 hours as needed for shortness of breath.       aspirin (ASA) 81 MG EC tablet Take 1 tablet (81 mg) by mouth daily. 90 tablet 3     atorvastatin (LIPITOR) 40 MG tablet Take 1 tablet (40 mg) by mouth every evening. 90 tablet 3     fluticasone (FLONASE) 50 MCG/ACT nasal spray Spray 2 sprays into both nostrils daily as needed for allergies (or a cold).       gabapentin (NEURONTIN) 300 MG capsule Take 300 mg by mouth every evening.       lisinopril (ZESTRIL) 30 MG tablet Take 1 tablet (30 mg) by mouth daily. 90 tablet 1     Melatonin 10 MG TABS tablet Take 10 mg by mouth at bedtime.       nicotine (COMMIT) 2 MG lozenge Place 1 lozenge (2 mg) inside cheek every  hour as needed for nicotine withdrawal symptoms. 60 lozenge 0     nicotine (NICODERM CQ) 7 MG/24HR 24 hr patch Place 1 patch over 24 hours onto the skin every 24 hours. 30 patch 0     SYMBICORT 80-4.5 MCG/ACT Inhaler Inhale 2 puffs into the lungs daily.       Vitamin D3 (VITAMIN D, CHOLECALCIFEROL,) 25 mcg (1000 units) tablet Take 1 tablet by mouth daily (with lunch).       clopidogrel (PLAVIX) 75 MG tablet Take 1 tablet (75 mg) by mouth daily. (Patient not taking: Reported on 6/9/2025) 20 tablet 0     No current facility-administered medications for this visit.        PHYSICAL EXAMINATION  VITALS: BP (!) 154/97   Pulse 68   Wt 69.9 kg (154 lb)   LMP  (LMP Unknown)   BMI 26.43 kg/m    GENERAL: Healthy appearing, alert, no acute distress, normal habitus.  CARDIOVASCULAR: Extremities warm and well perfused. Pulses present.   NEUROLOGICAL:  Patient is awake and oriented to self, place and time.  Attention span is normal.  Memory is grossly intact.  Language is fluent and follows commands appropriately.  Appropriate fund of knowledge. Cranial nerves 2-12 are intact. There is no pronator drift.  Motor exam shows 5/5 strength in all extremities.  Tone is symmetric bilaterally in upper and lower extremities.  Reflexes are symmetric and 2+ in upper extremities and lower extremities. Sensory exam is grossly intact to light touch, pin prick and vibration.  Finger to nose and heel to shin is without dysmetria.  Romberg is negative.  Gait is normal and the patient is able to do tandem walk with mild difficulty.    DIAGNOSTICS  MRI/images reviewed.  IMPRESSION:  Acute lacunar infarcts of the posterior limb left internal capsule and left periatrial white matter.      CT/CTA  NONCONTRAST HEAD CT:  1.  No acute intracranial abnormality.  2.  Chronic lacunar infarct in the right corona radiata extending to the posterior right lentiform nucleus.  3.  Prominent perivascular space versus chronic lacunar infarct in the inferior  basal ganglia bilaterally.     HEAD CTA:  1.  Mild narrowing of the bilateral M1 segments and bilateral PCA.  2.  No aneurysm and no high flow vascular malformation.     NECK CTA:  1.  Mild narrowing at the left common carotid artery and left carotid bifurcation with a small ulcerative plaque at the left carotid bifurcation.  2.  Mild narrowing at the right carotid bifurcation and proximal right internal carotid artery.  3.  No high grade vertebral artery stenosis.      ECHO  Technically difficult study.  The left ventricle is normal in size. There is normal left ventricular wall  thickness.  Left ventricular systolic function is normal. The visual ejection fraction is  55-60%. No regional wall motion abnormalities noted.  Grade I or early diastolic dysfunction.     The right ventricle is normal in size and function.  The left atrium is not well visualized. Right atrium not well visualized.  IVC diameter <2.1 cm collapsing >50% with sniff suggests a normal RA pressure  of 3 mmHg.  There is no comparison study available.      EKG      Sinus rhythm with occasional Premature ventricular complexes       RELEVANT LABS  Component      Latest Ref Rng 5/16/2025  9:49 AM   Cholesterol      <200 mg/dL 212 (H)    Triglycerides      <150 mg/dL 149    HDL Cholesterol      >=50 mg/dL 60    LDL Cholesterol Calculated      <100 mg/dL 122 (H)    Non HDL Cholesterol      <130 mg/dL 152 (H)    Estimated Average Glucose      <117 mg/dL 111    Hemoglobin A1C      <5.7 % 5.5       Legend:  (H) High    OUTSIDE RECORDS  Outside referral notes and chart notes were reviewed and pertinent information has been summarized (in addition to the HPI):-          IMPRESSION/REPORT/PLAN  Hyperlipidemia LDL goal <70  Acute CVA (cerebrovascular accident) (H)  Essential hypertension    This is a 70 year old female with presentation of dysarthria, right sided vision loss and right-sided weakness.  On the MRI she was identified to have a left posterior  limb internal capsule and left parietal white matter infarct.  She is status post tenecteplase.  CT angiogram has been negative for any significant large vessel occlusion/stenosis.  Will check a carotid ultrasound to further evaluate.  Echocardiogram was negative for thrombus.  Will check a event monitor to rule out any cardiac etiology.    Recommend continuing aspirin and Lipitor.  Check a lipid panel as LDL was elevated in the hospital.  LDL goal less than 70.  Recommend that she stop smoking.  Recommend exercise and healthy lifestyle.    Her blood pressure has been running high and will increase the dose of lisinopril from 20 mg to 30 mg.  Recommend further management of vascular is factors for primary care.    Can see her back in 3 months.    -     Lipid panel reflex to direct LDL Fasting; Future  -     US Carotid Bilateral  -     Cardiac Mobile Telemetry Monitor Adult Pediatric; Future  -     Lipid panel reflex to direct LDL Fasting; Future  -     lisinopril (ZESTRIL) 30 MG tablet; Take 1 tablet (30 mg) by mouth daily.  - Smoking cessation  - Aspirin 81 mg and Lipitor 40 mg    Return in about 3 months (around 9/9/2025) for In-Clinic Visit (must), Add on PAOR, After testing.    Over 60 minutes were spent coordinating the care for the patient on the day of the encounter.  This includes previsit, during visit and post visit activities as documented above.  Counseling patient.  Reviewing chart.  Patient new to me.  High risk.  Testing ordered.  Reviewing previous testing/MRI images.  (Activities include but not inclusive of reviewing chart, reviewing outside records, reviewing labs and imaging study results as well as the images, patient visit time including getting history and exam,  use if applicable, review of test results with the patient and coming up with a plan in a shared model, counseling patient and family, education and answering patient questions, EMR , EMR diagnosis entry and  problem list management, medication reconciliation and prescription management if applicable, paperwork if applicable, printing documents and documentation of the visit activities.)        Honorio Shaw MD  Neurologist  [facility] Neurology [facility]  Tel:- [phone]    This note was dictated using voice recognition software.  Any grammatical or context distortions are unintentional and inherent to the software.      The longitudinal plan of care for the diagnosis(es)/condition(s) as documented were addressed during this visit. Due to the added complexity in care, I will continue to support Gerri in the subsequent management and with ongoing continuity of care.    Again, thank you for allowing me to participate in the care of your patient.        Sincerely,        Honorio Shaw MD    Electronically signed

## 2025-06-09 NOTE — NURSING NOTE
"Gerri Stallings is a 70 year old female who presents for:  Chief Complaint   Patient presents with    Stroke     Patient has been doing well.         Initial Vitals:  BP (!) 154/97   Pulse 68   Wt 69.9 kg (154 lb)   LMP  (LMP Unknown)   BMI 26.43 kg/m   Estimated body mass index is 26.43 kg/m  as calculated from the following:    Height as of 5/22/25: 1.626 m (5' 4\").    Weight as of this encounter: 69.9 kg (154 lb).. Body surface area is 1.78 meters squared. BP completed using cuff size: wrist cuff    Eugene Abraham  "

## 2025-06-09 NOTE — PROGRESS NOTES
NEUROLOGY OUTPATIENT CONSULT NOTE   Jun 9, 2025     CHIEF COMPLAINT/REASON FOR VISIT/REASON FOR CONSULT  Patient presents with:  Stroke: Patient has been doing well.     REASON FOR CONSULTATION- Stroke    REFERRAL SOURCE  Dr. Evelio Graham  CC Dr. Evelio Graham    HISTORY OF PRESENT ILLNESS  Gerri Stallings is a 70 year old female seen today for evaluation of stroke.  She has a history of smoking.  She presented to the emergency room in May 2025 with right-sided weakness and slurred speech.  Patient was having some vision issues in the right eye as well.  She was given tenecteplase in the hospital.  MRI brain did show posterior limb of the left internal capsule and left parietal white matter.  Denies any chest pains palpitations or headaches.    Since discharge she had been working with PT and OT and has made significant improvement with no ongoing symptoms.  Has been able to cut down on smoking but has not completely stopped.  Trying to stay active.  Is eating healthy.  She is currently on aspirin and Lipitor and tolerating those well.  She is also on lisinopril.  Blood pressure has been running in the 150s range.  She has stopped the Plavix that she was initially put on in the hospital.  No new symptoms.    Previous history is reviewed and this is unchanged.    PAST MEDICAL/SURGICAL HISTORY  No past medical history on file.  Patient Active Problem List   Diagnosis    Hypertension    Lower Back Pain    Lumbar Radiculopathy    Tobacco use disorder    Metatarsalgia of left foot    Hammertoe of left foot    Hallux valgus with bunions of left foot    Cortical age-related cataract of left eye    Sesamoiditis of right foot    Capsulitis of foot, right    Acute CVA (cerebrovascular accident) (H)   Positive for stroke and high blood pressure    FAMILY HISTORY  Family History   Problem Relation Age of Onset    Cerebrovascular Disease Mother     Hypertension Mother     Diverticulitis Mother     Glaucoma Mother     Obesity  Mother     Pancreatic Cancer Father         metastatic    Hypertension Sister     Obesity Sister     Diabetes Brother     Obesity Brother    Positive for stroke and high blood pressure.    SOCIAL HISTORY  Social History     Tobacco Use    Smoking status: Every Day     Current packs/day: 0.25     Average packs/day: 0.9 packs/day for 51.4 years (46.6 ttl pk-yrs)     Types: Cigarettes     Start date: 1974    Smokeless tobacco: Never    Tobacco comments:     cutting back 1/25/2016   Substance Use Topics    Alcohol use: Yes     Alcohol/week: 4.0 standard drinks of alcohol    Drug use: No       SYSTEMS REVIEW  Twelve-system ROS was done and other than the HPI this was negative.  Pertinent positives noted in the HPI.    MEDICATIONS  Current Outpatient Medications   Medication Sig Dispense Refill    acetaminophen (TYLENOL) 500 MG tablet Take 500-1,000 mg by mouth daily.      albuterol (PROAIR HFA/PROVENTIL HFA/VENTOLIN HFA) 108 (90 Base) MCG/ACT inhaler Inhale 1-2 puffs into the lungs every 4 hours as needed for shortness of breath.      aspirin (ASA) 81 MG EC tablet Take 1 tablet (81 mg) by mouth daily. 90 tablet 3    atorvastatin (LIPITOR) 40 MG tablet Take 1 tablet (40 mg) by mouth every evening. 90 tablet 3    fluticasone (FLONASE) 50 MCG/ACT nasal spray Spray 2 sprays into both nostrils daily as needed for allergies (or a cold).      gabapentin (NEURONTIN) 300 MG capsule Take 300 mg by mouth every evening.      lisinopril (ZESTRIL) 30 MG tablet Take 1 tablet (30 mg) by mouth daily. 90 tablet 1    Melatonin 10 MG TABS tablet Take 10 mg by mouth at bedtime.      nicotine (COMMIT) 2 MG lozenge Place 1 lozenge (2 mg) inside cheek every hour as needed for nicotine withdrawal symptoms. 60 lozenge 0    nicotine (NICODERM CQ) 7 MG/24HR 24 hr patch Place 1 patch over 24 hours onto the skin every 24 hours. 30 patch 0    SYMBICORT 80-4.5 MCG/ACT Inhaler Inhale 2 puffs into the lungs daily.      Vitamin D3 (VITAMIN D,  CHOLECALCIFEROL,) 25 mcg (1000 units) tablet Take 1 tablet by mouth daily (with lunch).      clopidogrel (PLAVIX) 75 MG tablet Take 1 tablet (75 mg) by mouth daily. (Patient not taking: Reported on 6/9/2025) 20 tablet 0     No current facility-administered medications for this visit.        PHYSICAL EXAMINATION  VITALS: BP (!) 154/97   Pulse 68   Wt 69.9 kg (154 lb)   LMP  (LMP Unknown)   BMI 26.43 kg/m    GENERAL: Healthy appearing, alert, no acute distress, normal habitus.  CARDIOVASCULAR: Extremities warm and well perfused. Pulses present.   NEUROLOGICAL:  Patient is awake and oriented to self, place and time.  Attention span is normal.  Memory is grossly intact.  Language is fluent and follows commands appropriately.  Appropriate fund of knowledge. Cranial nerves 2-12 are intact. There is no pronator drift.  Motor exam shows 5/5 strength in all extremities.  Tone is symmetric bilaterally in upper and lower extremities.  Reflexes are symmetric and 2+ in upper extremities and lower extremities. Sensory exam is grossly intact to light touch, pin prick and vibration.  Finger to nose and heel to shin is without dysmetria.  Romberg is negative.  Gait is normal and the patient is able to do tandem walk with mild difficulty.    DIAGNOSTICS  MRI/images reviewed.  IMPRESSION:  Acute lacunar infarcts of the posterior limb left internal capsule and left periatrial white matter.      CT/CTA  NONCONTRAST HEAD CT:  1.  No acute intracranial abnormality.  2.  Chronic lacunar infarct in the right corona radiata extending to the posterior right lentiform nucleus.  3.  Prominent perivascular space versus chronic lacunar infarct in the inferior basal ganglia bilaterally.     HEAD CTA:  1.  Mild narrowing of the bilateral M1 segments and bilateral PCA.  2.  No aneurysm and no high flow vascular malformation.     NECK CTA:  1.  Mild narrowing at the left common carotid artery and left carotid bifurcation with a small ulcerative  plaque at the left carotid bifurcation.  2.  Mild narrowing at the right carotid bifurcation and proximal right internal carotid artery.  3.  No high grade vertebral artery stenosis.      ECHO  Technically difficult study.  The left ventricle is normal in size. There is normal left ventricular wall  thickness.  Left ventricular systolic function is normal. The visual ejection fraction is  55-60%. No regional wall motion abnormalities noted.  Grade I or early diastolic dysfunction.     The right ventricle is normal in size and function.  The left atrium is not well visualized. Right atrium not well visualized.  IVC diameter <2.1 cm collapsing >50% with sniff suggests a normal RA pressure  of 3 mmHg.  There is no comparison study available.      EKG      Sinus rhythm with occasional Premature ventricular complexes       RELEVANT LABS  Component      Latest Ref Rng 5/16/2025  9:49 AM   Cholesterol      <200 mg/dL 212 (H)    Triglycerides      <150 mg/dL 149    HDL Cholesterol      >=50 mg/dL 60    LDL Cholesterol Calculated      <100 mg/dL 122 (H)    Non HDL Cholesterol      <130 mg/dL 152 (H)    Estimated Average Glucose      <117 mg/dL 111    Hemoglobin A1C      <5.7 % 5.5       Legend:  (H) High    OUTSIDE RECORDS  Outside referral notes and chart notes were reviewed and pertinent information has been summarized (in addition to the HPI):-          IMPRESSION/REPORT/PLAN  Hyperlipidemia LDL goal <70  Acute CVA (cerebrovascular accident) (H)  Essential hypertension    This is a 70 year old female with presentation of dysarthria, right sided vision loss and right-sided weakness.  On the MRI she was identified to have a left posterior limb internal capsule and left parietal white matter infarct.  She is status post tenecteplase.  CT angiogram has been negative for any significant large vessel occlusion/stenosis.  Will check a carotid ultrasound to further evaluate.  Echocardiogram was negative for thrombus.  Will check  a event monitor to rule out any cardiac etiology.    Recommend continuing aspirin and Lipitor.  Check a lipid panel as LDL was elevated in the hospital.  LDL goal less than 70.  Recommend that she stop smoking.  Recommend exercise and healthy lifestyle.    Her blood pressure has been running high and will increase the dose of lisinopril from 20 mg to 30 mg.  Recommend further management of vascular is factors for primary care.    Can see her back in 3 months.    -     Lipid panel reflex to direct LDL Fasting; Future  -     US Carotid Bilateral  -     Cardiac Mobile Telemetry Monitor Adult Pediatric; Future  -     Lipid panel reflex to direct LDL Fasting; Future  -     lisinopril (ZESTRIL) 30 MG tablet; Take 1 tablet (30 mg) by mouth daily.  - Smoking cessation  - Aspirin 81 mg and Lipitor 40 mg    Return in about 3 months (around 9/9/2025) for In-Clinic Visit (must), Add on PAOR, After testing.    Over 60 minutes were spent coordinating the care for the patient on the day of the encounter.  This includes previsit, during visit and post visit activities as documented above.  Counseling patient.  Reviewing chart.  Patient new to me.  High risk.  Testing ordered.  Reviewing previous testing/MRI images.  (Activities include but not inclusive of reviewing chart, reviewing outside records, reviewing labs and imaging study results as well as the images, patient visit time including getting history and exam,  use if applicable, review of test results with the patient and coming up with a plan in a shared model, counseling patient and family, education and answering patient questions, EMR , EMR diagnosis entry and problem list management, medication reconciliation and prescription management if applicable, paperwork if applicable, printing documents and documentation of the visit activities.)        Honorio Shaw MD  Neurologist  [facility] Neurology [facility]  Tel:-  [phone]    This note was dictated using voice recognition software.  Any grammatical or context distortions are unintentional and inherent to the software.      The longitudinal plan of care for the diagnosis(es)/condition(s) as documented were addressed during this visit. Due to the added complexity in care, I will continue to support Gerri in the subsequent management and with ongoing continuity of care.

## 2025-06-13 ENCOUNTER — TELEPHONE (OUTPATIENT)
Dept: NEUROLOGY | Facility: CLINIC | Age: 70
End: 2025-06-13
Payer: COMMERCIAL

## 2025-06-13 DIAGNOSIS — I63.9 ACUTE CVA (CEREBROVASCULAR ACCIDENT) (H): Primary | ICD-10-CM

## 2025-06-13 NOTE — TELEPHONE ENCOUNTER
RN called patient and informed that insurance denied 14 day heart monitor due to not having documentation of a heart monitor for shorter period of time 48-72 hours. She is agreeable to do shorter monitor, with goal of insurance approving longer monitor period following this testing. She would like to keep appt on Monday.     RN called heart center, they confirmed that she can keep appt Monday to place the 48 hour Holter monitor.     MD please place order for 48 hour Holter monitor.     Thank you  Patric JOE RN, BSN  [facility] Neurology

## 2025-06-13 NOTE — TELEPHONE ENCOUNTER
RE: [employer] [place] Denial Notification- Appeal Request  Received: Today  Honorio Shaw MD Lohela, Christa; IV[facility]N [facility]  They generally do not document the results of the heart monitor unless it is abnormal.  We will have to reorder the 48-hour Holter as requested by the insurance.    Neurology staff:-Please send me encounter message to order the Holter and please update the patient on the plan.          Previous Messages       ----- Message -----  From: Yasmine Quispe  Sent: 2025   7:01 AM CDT  To: Honorio Shaw MD; [facility] P*  Subject: RE: [employer] [place] Denial Notification- Appeal R*    Hello,    There are no results of the Holter monitor in the chart, nor in the hospital notes. If we can get those results I can see if they will reconsider the denial.    Yasmine  ----- Message -----  From: Honorio Shaw MD  Sent: 2025   4:13 PM CDT  To: Yasmine Quispe; [facility]  Subject: RE: [employer] [place] Denial Notification- Appeal R*    She had a heart monitor in the hospital for 2 days.  Is the insurance requiring that we repeat the Holter monitor?  With the heart monitoring in the hospital count?  ----- Message -----  From: Yasmine Quispe  Sent: 2025   4:04 PM CDT  To: Honorio Shaw MD; [facility]*  Subject: [employer] Denial Notification- Appeal Reque*    [employer] Denial Notification    Patient Name: Gerri Stallings  : 1955  MRN: [id number]    Dr. Shaw,    The authorization for procedure CARDIAC MOBILE TELEMETRY MONITOR on date of service 2025 has been denied by the patient's insurance for the following reason:    Denial Reason: We are not approving your request because:  You have not worn a standard Holter monitor (a device that is worn that  can record your heart rate and rhythm from 48-72 hours) or an ambulatory  event monitor (a device worn which records your heart rate for up to 30  days) within the past 90 days, and  documentation does not contain a note  from your healthcare providers stating your symptoms would be unlikely to  be recorded by either device, because your symptoms do not occur  frequently enough.  This means that if you decide to proceed with it, you'll be responsible for the cost. Your  plan does not cover any services associated with non-covered services, and you will be  responsible for any of those costs as well.    Below is the information we provided to the patient's insurance company:    Order:                          06/09/2025- Cardiac Mobile Telemetry Monitor Order  Visit Notes:                  06/09/2025- office visit  Results:                       05/16/2025- ecg, 05/16/2025- echo  Other:                          05/16/2025- ed    An appeal can be filed for possible decision reversal. This can take up to 30 days for the insurance to process once submitted. If you wish to proceed with an appeal, please provide additional clinical records and a letter explaining why you feel this service is medically necessary.    Please let us know how you wish to proceed as soon as possible. We may contact the patient if we do not receive a response.    Thank you,    Yasmine Villalpando Prior Authorization

## 2025-06-16 ENCOUNTER — HOSPITAL ENCOUNTER (OUTPATIENT)
Dept: CARDIOLOGY | Facility: HOSPITAL | Age: 70
Discharge: HOME | End: 2025-06-16
Attending: PSYCHIATRY & NEUROLOGY | Admitting: PSYCHIATRY & NEUROLOGY
Payer: COMMERCIAL

## 2025-06-16 DIAGNOSIS — I63.9 ACUTE CVA (CEREBROVASCULAR ACCIDENT) (H): ICD-10-CM

## 2025-06-16 PROCEDURE — 93225 XTRNL ECG REC<48 HRS REC: CPT

## 2025-07-03 ENCOUNTER — RESULTS FOLLOW-UP (OUTPATIENT)
Dept: NEUROLOGY | Facility: CLINIC | Age: 70
End: 2025-07-03

## 2025-08-11 PROBLEM — E78.2 MIXED HYPERLIPIDEMIA: Status: ACTIVE | Noted: 2025-08-11

## 2025-08-12 ENCOUNTER — OFFICE VISIT (OUTPATIENT)
Dept: FAMILY MEDICINE | Facility: CLINIC | Age: 70
End: 2025-08-12
Payer: COMMERCIAL

## 2025-08-12 VITALS
OXYGEN SATURATION: 100 % | HEIGHT: 64 IN | SYSTOLIC BLOOD PRESSURE: 126 MMHG | RESPIRATION RATE: 18 BRPM | HEART RATE: 72 BPM | WEIGHT: 152 LBS | DIASTOLIC BLOOD PRESSURE: 84 MMHG | TEMPERATURE: 97.9 F | BODY MASS INDEX: 25.95 KG/M2

## 2025-08-12 DIAGNOSIS — E78.2 MIXED HYPERLIPIDEMIA: ICD-10-CM

## 2025-08-12 DIAGNOSIS — I73.9 CLAUDICATION OF BOTH LOWER EXTREMITIES: ICD-10-CM

## 2025-08-12 DIAGNOSIS — Z78.0 POSTMENOPAUSAL STATUS: ICD-10-CM

## 2025-08-12 DIAGNOSIS — I63.9 ACUTE CVA (CEREBROVASCULAR ACCIDENT) (H): ICD-10-CM

## 2025-08-12 DIAGNOSIS — Z00.00 ROUTINE GENERAL MEDICAL EXAMINATION AT A HEALTH CARE FACILITY: Primary | ICD-10-CM

## 2025-08-12 DIAGNOSIS — I10 ESSENTIAL HYPERTENSION: ICD-10-CM

## 2025-08-12 PROCEDURE — 99397 PER PM REEVAL EST PAT 65+ YR: CPT | Performed by: FAMILY MEDICINE

## 2025-08-12 PROCEDURE — 99213 OFFICE O/P EST LOW 20 MIN: CPT | Mod: 25 | Performed by: FAMILY MEDICINE

## 2025-08-12 PROCEDURE — 3079F DIAST BP 80-89 MM HG: CPT | Performed by: FAMILY MEDICINE

## 2025-08-12 PROCEDURE — 3074F SYST BP LT 130 MM HG: CPT | Performed by: FAMILY MEDICINE

## 2025-08-12 SDOH — HEALTH STABILITY: PHYSICAL HEALTH: ON AVERAGE, HOW MANY DAYS PER WEEK DO YOU ENGAGE IN MODERATE TO STRENUOUS EXERCISE (LIKE A BRISK WALK)?: 1 DAY

## 2025-08-12 ASSESSMENT — SOCIAL DETERMINANTS OF HEALTH (SDOH): HOW OFTEN DO YOU GET TOGETHER WITH FRIENDS OR RELATIVES?: ONCE A WEEK

## 2025-08-14 ENCOUNTER — TELEPHONE (OUTPATIENT)
Dept: FAMILY MEDICINE | Facility: CLINIC | Age: 70
End: 2025-08-14
Payer: COMMERCIAL

## 2025-08-20 ENCOUNTER — TELEPHONE (OUTPATIENT)
Dept: FAMILY MEDICINE | Facility: CLINIC | Age: 70
End: 2025-08-20
Payer: COMMERCIAL